# Patient Record
Sex: FEMALE | Race: WHITE | NOT HISPANIC OR LATINO | Employment: OTHER | URBAN - METROPOLITAN AREA
[De-identification: names, ages, dates, MRNs, and addresses within clinical notes are randomized per-mention and may not be internally consistent; named-entity substitution may affect disease eponyms.]

---

## 2023-02-20 LAB — CHOLESTEROL IN LDL (MG/DL) IN SER/PLAS BY DIRECT ASSAY: 64 MG/DL (ref 0–129)

## 2023-03-03 PROBLEM — Z00.00 MEDICARE ANNUAL WELLNESS VISIT, SUBSEQUENT: Status: ACTIVE | Noted: 2023-03-03

## 2023-03-03 PROBLEM — M48.50XA VERTEBRAL COMPRESSION FRACTURE (MULTI): Status: ACTIVE | Noted: 2023-03-03

## 2023-03-03 PROBLEM — I10 ESSENTIAL HYPERTENSION: Status: ACTIVE | Noted: 2023-03-03

## 2023-03-03 PROBLEM — K80.20 GALL STONES: Status: ACTIVE | Noted: 2023-03-03

## 2023-03-03 PROBLEM — M19.042 PRIMARY OSTEOARTHRITIS OF BOTH HANDS: Status: ACTIVE | Noted: 2023-03-03

## 2023-03-03 PROBLEM — E55.9 VITAMIN D INSUFFICIENCY: Status: ACTIVE | Noted: 2023-03-03

## 2023-03-03 PROBLEM — N81.10 FEMALE BLADDER PROLAPSE: Status: ACTIVE | Noted: 2023-03-03

## 2023-03-03 PROBLEM — R93.1 AGATSTON CAC SCORE, <100: Status: ACTIVE | Noted: 2023-03-03

## 2023-03-03 PROBLEM — E78.2 HYPERLIPEMIA, MIXED: Status: ACTIVE | Noted: 2023-03-03

## 2023-03-03 PROBLEM — N21.0: Status: ACTIVE | Noted: 2023-03-03

## 2023-03-03 PROBLEM — M85.80 OSTEOPENIA: Status: ACTIVE | Noted: 2023-03-03

## 2023-03-03 PROBLEM — N81.6 PELVIC ORGAN PROLAPSE QUANTIFICATION STAGE 3 RECTOCELE: Status: ACTIVE | Noted: 2023-03-03

## 2023-03-03 PROBLEM — M19.041 PRIMARY OSTEOARTHRITIS OF BOTH HANDS: Status: ACTIVE | Noted: 2023-03-03

## 2023-03-03 PROBLEM — M15.9 GENERALIZED OSTEOARTHRITIS OF MULTIPLE SITES: Status: ACTIVE | Noted: 2023-03-03

## 2023-03-03 RX ORDER — OXYQUINOLINE SULFATE AND SODIUM LAURYL SULFATE .25; .1 MG/G; MG/G
JELLY VAGINAL
COMMUNITY
Start: 2020-05-15 | End: 2024-03-15 | Stop reason: WASHOUT

## 2023-03-03 RX ORDER — ACETAMINOPHEN 500 MG
1 TABLET ORAL DAILY
COMMUNITY
Start: 2019-11-14 | End: 2023-09-08 | Stop reason: SDUPTHER

## 2023-03-03 RX ORDER — BENAZEPRIL HYDROCHLORIDE 20 MG/1
1 TABLET ORAL DAILY
COMMUNITY
Start: 2019-11-14 | End: 2023-04-21 | Stop reason: SDUPTHER

## 2023-03-03 RX ORDER — PRAVASTATIN SODIUM 20 MG/1
1 TABLET ORAL NIGHTLY
COMMUNITY
Start: 2019-11-14 | End: 2023-11-28 | Stop reason: SDUPTHER

## 2023-03-03 RX ORDER — ESTRADIOL 0.1 MG/G
CREAM VAGINAL 2 TIMES WEEKLY
COMMUNITY
Start: 2022-02-15 | End: 2024-03-15 | Stop reason: WASHOUT

## 2023-03-03 RX ORDER — CALCIUM CARBONATE/VITAMIN D3 600MG-5MCG
1 TABLET ORAL 2 TIMES DAILY
COMMUNITY
Start: 2019-11-14

## 2023-03-03 RX ORDER — PSYLLIUM SEED (WITH DEXTROSE)
POWDER (GRAM) ORAL
COMMUNITY

## 2023-03-03 RX ORDER — AMLODIPINE BESYLATE 10 MG/1
1 TABLET ORAL DAILY
COMMUNITY
Start: 2022-02-25 | End: 2023-05-03 | Stop reason: SDUPTHER

## 2023-03-03 RX ORDER — UBIQUINOL 100 MG
1 CAPSULE ORAL DAILY
COMMUNITY
Start: 2019-11-14

## 2023-03-03 NOTE — ASSESSMENT & PLAN NOTE
Medicare Wellness exam done  Already got flus shot  COVID vaccination series complete  Boostrix recommended  Prevnar 13 11/16  Pneumovax 23 10/15  DEXA 3/22  Shingrix #1 1/23   Cologuard 11/60  Coronary calcium scoring 6/19 - 12  nonsmoker

## 2023-03-03 NOTE — ASSESSMENT & PLAN NOTE
Improved on recheck.  Continue current medication for hypertension. Continue to monitor blood pressure.  Call if blood pressure consistently >140/90.  Low salt diet recommended.  Increase physical activity as able.  Reevaluate in 6 months.

## 2023-03-03 NOTE — PROGRESS NOTES
Subjective :  Chief Complaint: Harper Ruiz is an 82 y.o. female here for an annual Medicare Wellness visit, yearly physical and follow-up labs and chronic conditions.    BP running 130s-140s/80s-100s at home.    Patient otherwise feels well and has no other complaints or concerns.      I have reviewed and reconciled the medication list with the patient today.    Current Outpatient Medications:     amLODIPine (Norvasc) 10 mg tablet, Take 1 tablet (10 mg) by mouth once daily., Disp: , Rfl:     benazepril (Lotensin) 20 mg tablet, Take 1 tablet (20 mg) by mouth once daily., Disp: , Rfl:     calcium carbonate-vitamin D3 600 mg-5 mcg (200 unit) tablet, Take 1 tablet by mouth in the morning and 1 tablet before bedtime., Disp: , Rfl:     cholecalciferol (Vitamin D-3) 50 mcg (2,000 unit) capsule, Take 1 capsule (50 mcg) by mouth once daily., Disp: , Rfl:     coQ10, ubiquinol, 100 mg capsule, Take 1 capsule by mouth in the morning and 1 capsule in the evening and 1 capsule before bedtime., Disp: , Rfl:     estradiol (Estrace) 0.01 % (0.1 mg/gram) vaginal cream, Insert into the vagina 2 times a week. Insert pea-sized amount into the vagina twice weekly, Disp: , Rfl:     pravastatin (Pravachol) 20 mg tablet, Take 1 tablet (20 mg) by mouth once daily at bedtime., Disp: , Rfl:     psyllium husk, with sugar, (Metamucil, with sugar,) 3.4 gram/12 gram powder, Take by mouth., Disp: , Rfl:     vit A/vit C/vit E/zinc/copper (PRESERVISION AREDS ORAL), Take 2 capsules by mouth once daily., Disp: , Rfl:     L. acidophilus/Bifid. animalis 32 billion cell capsule, Take by mouth., Disp: , Rfl:     oxyquinoline-sod.lauryl sulfat (Trimo-Gr Jelly) 0.025-0.01 % gel, Insert into the vagina., Disp: , Rfl:     The patient's relevant past medical, surgical, family and social history was reviewed in [facility].  All pertinent lab work and results for this visit were reviewed with patient.    Review of Systems   A complete review of systems was performed  "and all systems were normal except what is noted in the HPI.      List of current healthcare providers:  Patient Care Team:  Kady Shane MD as PCP - General  Kady Shane MD as PCP - [employer] PCP        Over the past 2 weeks, how often have you been bothered by any of the following problems?  Little interest or pleasure in doing things: Not at all  Feeling down, depressed, or hopeless: Not at all  Patient Health Questionnaire-2 Score: 0             Objective :  /87   Pulse 86   Temp 36.1 °C (97 °F)   Ht 1.549 m (5' 1\")   Wt 66.4 kg (146 lb 6.4 oz)   SpO2 96%   BMI 27.66 kg/m²    No results found.  Physical Exam  Constitutional:       Appearance: Normal appearance.   HENT:      Head: Normocephalic and atraumatic.   Neck:      Vascular: No carotid bruit.   Cardiovascular:      Rate and Rhythm: Normal rate and regular rhythm.      Heart sounds: Normal heart sounds.   Pulmonary:      Effort: Pulmonary effort is normal.      Breath sounds: Normal breath sounds. No wheezing, rhonchi or rales.   Abdominal:      General: Abdomen is flat. Bowel sounds are normal.      Palpations: Abdomen is soft.      Tenderness: There is no abdominal tenderness. There is no guarding.   Musculoskeletal:         General: Normal range of motion.      Right lower leg: No edema.      Left lower leg: No edema.   Skin:     General: Skin is dry.   Neurological:      General: No focal deficit present.      Mental Status: She is alert and oriented to person, place, and time.   Psychiatric:         Mood and Affect: Mood normal.         Behavior: Behavior normal.         Thought Content: Thought content normal.         Assessment/Plan :  Problem List Items Addressed This Visit       Essential hypertension     Improved on recheck.  Continue current medication for hypertension. Continue to monitor blood pressure.  Call if blood pressure consistently >140/90.  Low salt diet recommended.  Increase physical " activity as able.  Reevaluate in 6 months.           Relevant Orders    Comprehensive Metabolic Panel    Hyperlipemia, mixed     Well controlled continue current medication. Reevaluate in 6 months.            Relevant Orders    Cholesterol, LDL Direct    Lipid Panel    Comprehensive Metabolic Panel    Vertebral compression fracture (CMS/HCC)     Asymptomatic  Reevaluate in 6 months.         Relevant Orders    XR DEXA bone density    Vitamin D insufficiency     Well controlled continue current medication. Reevaluate in 6 months.            Medicare annual wellness visit, subsequent - Primary     Medicare Wellness exam done  Already got flus shot  COVID vaccination series complete  Boostrix recommended  Prevnar 13 11/16  Pneumovax 23 10/15  DEXA 3/22  Shingrix #1 1/23   Cologuard 11/60  Coronary calcium scoring 6/19 - 12  nonsmoker         Relevant Orders    XR DEXA bone density     Other Visit Diagnoses       Annual physical exam        yearly physical done    Relevant Orders    XR DEXA bone density    Asymptomatic menopause        Relevant Orders    XR DEXA bone density               The following health maintenance schedule was reviewed with the patient and provided in printed form in the after visit summary:  Health Maintenance   Topic Date Due    Annual Wellness Visit (AWV)  Never done    DTaP/Tdap/Td Vaccines (1 - Tdap) Never done    Diabetes Screening  Never done    Bone Density Scan  03/15/2023    Zoster Vaccines (2 of 2) 03/24/2023    Lipid Panel  02/20/2028    Influenza Vaccine  Completed    Pneumococcal Vaccine: 65+ Years  Completed    COVID-19 Vaccine  Completed    HIB Vaccines  Aged Out    Hepatitis B Vaccines  Aged Out    IPV Vaccines  Aged Out    Hepatitis A Vaccines  Aged Out    Meningococcal Vaccine  Aged Out    Rotavirus Vaccines  Aged Out    HPV Vaccines  Aged Out             Patient understands and agrees with treatment plan.          Kady Shane MD

## 2023-03-06 ENCOUNTER — OFFICE VISIT (OUTPATIENT)
Dept: PRIMARY CARE | Facility: CLINIC | Age: 83
End: 2023-03-06
Payer: MEDICARE

## 2023-03-06 VITALS
SYSTOLIC BLOOD PRESSURE: 138 MMHG | HEIGHT: 61 IN | TEMPERATURE: 97 F | OXYGEN SATURATION: 96 % | DIASTOLIC BLOOD PRESSURE: 88 MMHG | BODY MASS INDEX: 27.64 KG/M2 | HEART RATE: 86 BPM | WEIGHT: 146.4 LBS

## 2023-03-06 DIAGNOSIS — S32.000S COMPRESSION FRACTURE OF LUMBAR VERTEBRA, UNSPECIFIED LUMBAR VERTEBRAL LEVEL, SEQUELA: ICD-10-CM

## 2023-03-06 DIAGNOSIS — Z78.0 ASYMPTOMATIC MENOPAUSE: ICD-10-CM

## 2023-03-06 DIAGNOSIS — Z00.00 MEDICARE ANNUAL WELLNESS VISIT, SUBSEQUENT: Primary | ICD-10-CM

## 2023-03-06 DIAGNOSIS — E78.2 HYPERLIPEMIA, MIXED: ICD-10-CM

## 2023-03-06 DIAGNOSIS — Z00.00 ANNUAL PHYSICAL EXAM: ICD-10-CM

## 2023-03-06 DIAGNOSIS — I10 ESSENTIAL HYPERTENSION: ICD-10-CM

## 2023-03-06 DIAGNOSIS — E55.9 VITAMIN D INSUFFICIENCY: ICD-10-CM

## 2023-03-06 PROCEDURE — G0439 PPPS, SUBSEQ VISIT: HCPCS | Performed by: FAMILY MEDICINE

## 2023-03-06 PROCEDURE — 99397 PER PM REEVAL EST PAT 65+ YR: CPT | Performed by: FAMILY MEDICINE

## 2023-03-06 PROCEDURE — 1157F ADVNC CARE PLAN IN RCRD: CPT | Performed by: FAMILY MEDICINE

## 2023-03-06 PROCEDURE — 1159F MED LIST DOCD IN RCRD: CPT | Performed by: FAMILY MEDICINE

## 2023-03-06 PROCEDURE — 1036F TOBACCO NON-USER: CPT | Performed by: FAMILY MEDICINE

## 2023-03-06 PROCEDURE — 1160F RVW MEDS BY RX/DR IN RCRD: CPT | Performed by: FAMILY MEDICINE

## 2023-03-06 PROCEDURE — 99214 OFFICE O/P EST MOD 30 MIN: CPT | Performed by: FAMILY MEDICINE

## 2023-03-06 PROCEDURE — 3075F SYST BP GE 130 - 139MM HG: CPT | Performed by: FAMILY MEDICINE

## 2023-03-06 PROCEDURE — 3079F DIAST BP 80-89 MM HG: CPT | Performed by: FAMILY MEDICINE

## 2023-03-06 PROCEDURE — 1170F FXNL STATUS ASSESSED: CPT | Performed by: FAMILY MEDICINE

## 2023-03-06 ASSESSMENT — ACTIVITIES OF DAILY LIVING (ADL)
GROCERY_SHOPPING: INDEPENDENT
BATHING: INDEPENDENT
DOING_HOUSEWORK: INDEPENDENT
MANAGING_FINANCES: INDEPENDENT
TAKING_MEDICATION: INDEPENDENT
DRESSING: INDEPENDENT

## 2023-03-06 ASSESSMENT — PATIENT HEALTH QUESTIONNAIRE - PHQ9
1. LITTLE INTEREST OR PLEASURE IN DOING THINGS: NOT AT ALL
SUM OF ALL RESPONSES TO PHQ9 QUESTIONS 1 AND 2: 0
2. FEELING DOWN, DEPRESSED OR HOPELESS: NOT AT ALL

## 2023-03-06 ASSESSMENT — ENCOUNTER SYMPTOMS
OCCASIONAL FEELINGS OF UNSTEADINESS: 1
DEPRESSION: 0
LOSS OF SENSATION IN FEET: 0

## 2023-03-08 ENCOUNTER — TELEPHONE (OUTPATIENT)
Dept: PRIMARY CARE | Facility: CLINIC | Age: 83
End: 2023-03-08
Payer: MEDICARE

## 2023-03-29 ASSESSMENT — ACTIVITIES OF DAILY LIVING (ADL)
DRESSING: INDEPENDENT
TAKING_MEDICATION: INDEPENDENT
GROCERY_SHOPPING: INDEPENDENT
BATHING: INDEPENDENT
MANAGING_FINANCES: INDEPENDENT
DOING_HOUSEWORK: INDEPENDENT

## 2023-04-21 ENCOUNTER — TELEPHONE (OUTPATIENT)
Dept: PRIMARY CARE | Facility: CLINIC | Age: 83
End: 2023-04-21
Payer: MEDICARE

## 2023-04-21 DIAGNOSIS — I10 HYPERTENSION, ESSENTIAL: ICD-10-CM

## 2023-04-21 RX ORDER — BENAZEPRIL HYDROCHLORIDE 20 MG/1
20 TABLET ORAL DAILY
Qty: 90 TABLET | Refills: 3 | Status: SHIPPED | OUTPATIENT
Start: 2023-04-21 | End: 2024-03-15 | Stop reason: SDUPTHER

## 2023-04-21 NOTE — TELEPHONE ENCOUNTER
Rx Refill Request Telephone Encounter    Name:  Harper Ruzi  :  [id number]  Medication Name:  Benazapril  20mg  oral  daily  90    Specific Pharmacy location:  [facility]  Date of last appointment:    Date of next appointment:    Best number to reach patient:  [phone]

## 2023-05-03 ENCOUNTER — TELEPHONE (OUTPATIENT)
Dept: PRIMARY CARE | Facility: CLINIC | Age: 83
End: 2023-05-03
Payer: MEDICARE

## 2023-05-03 DIAGNOSIS — I10 ESSENTIAL HYPERTENSION: Primary | ICD-10-CM

## 2023-05-03 RX ORDER — AMLODIPINE BESYLATE 10 MG/1
10 TABLET ORAL DAILY
Qty: 90 TABLET | Refills: 0 | Status: SHIPPED | OUTPATIENT
Start: 2023-05-03 | End: 2023-08-28 | Stop reason: SDUPTHER

## 2023-05-03 NOTE — TELEPHONE ENCOUNTER
Rx Refill Request Telephone Encounter    Name:  Harper Ruiz  :  [id number]  Medication Name:      Amlodipine Besylate 10 MG Oral Tablet 1 tab taken daily as directed                    Specific Pharmacy location:  [facility]    Date of last appointment:  2023  Date of next appointment:  2023  Best number to reach patient:  [phone]

## 2023-07-10 DIAGNOSIS — I10 ESSENTIAL HYPERTENSION: ICD-10-CM

## 2023-08-28 ENCOUNTER — TELEPHONE (OUTPATIENT)
Dept: PRIMARY CARE | Facility: CLINIC | Age: 83
End: 2023-08-28
Payer: MEDICARE

## 2023-08-28 DIAGNOSIS — I10 ESSENTIAL HYPERTENSION: ICD-10-CM

## 2023-08-28 RX ORDER — AMLODIPINE BESYLATE 10 MG/1
TABLET ORAL
Qty: 90 TABLET | Refills: 0 | OUTPATIENT
Start: 2023-08-28

## 2023-08-28 NOTE — TELEPHONE ENCOUNTER
Rx Refill Request Telephone Encounter    Name:  Harper Ruiz  :  [id number]  Medication Name:      amLODIPine (Norvasc) 10 mg tablet             Specific Pharmacy location:    [facility] - [address]        Date of last appointment:  3/6/23  Date of next appointment: 23   Best number to reach patient: [phone]

## 2023-08-29 RX ORDER — AMLODIPINE BESYLATE 10 MG/1
10 TABLET ORAL DAILY
Qty: 90 TABLET | Refills: 3 | Status: SHIPPED | OUTPATIENT
Start: 2023-08-29 | End: 2024-08-28

## 2023-08-31 ENCOUNTER — LAB (OUTPATIENT)
Dept: LAB | Facility: LAB | Age: 83
End: 2023-08-31
Payer: MEDICARE

## 2023-08-31 DIAGNOSIS — E78.2 HYPERLIPEMIA, MIXED: ICD-10-CM

## 2023-08-31 DIAGNOSIS — I10 ESSENTIAL HYPERTENSION: ICD-10-CM

## 2023-08-31 LAB
ALANINE AMINOTRANSFERASE (SGPT) (U/L) IN SER/PLAS: 29 U/L (ref 7–45)
ALBUMIN (G/DL) IN SER/PLAS: 4.3 G/DL (ref 3.4–5)
ALKALINE PHOSPHATASE (U/L) IN SER/PLAS: 71 U/L (ref 33–136)
ANION GAP IN SER/PLAS: 10 MMOL/L (ref 10–20)
ASPARTATE AMINOTRANSFERASE (SGOT) (U/L) IN SER/PLAS: 34 U/L (ref 9–39)
BILIRUBIN TOTAL (MG/DL) IN SER/PLAS: 0.7 MG/DL (ref 0–1.2)
CALCIUM (MG/DL) IN SER/PLAS: 9.2 MG/DL (ref 8.6–10.3)
CARBON DIOXIDE, TOTAL (MMOL/L) IN SER/PLAS: 31 MMOL/L (ref 21–32)
CHLORIDE (MMOL/L) IN SER/PLAS: 105 MMOL/L (ref 98–107)
CHOLESTEROL (MG/DL) IN SER/PLAS: 141 MG/DL (ref 0–199)
CHOLESTEROL IN HDL (MG/DL) IN SER/PLAS: 63.2 MG/DL
CHOLESTEROL/HDL RATIO: 2.2
CREATININE (MG/DL) IN SER/PLAS: 0.65 MG/DL (ref 0.5–1.05)
GFR FEMALE: 87 ML/MIN/1.73M2
GLUCOSE (MG/DL) IN SER/PLAS: 88 MG/DL (ref 74–99)
LDL: 56 MG/DL (ref 0–99)
POTASSIUM (MMOL/L) IN SER/PLAS: 3.5 MMOL/L (ref 3.5–5.3)
PROTEIN TOTAL: 7.2 G/DL (ref 6.4–8.2)
SODIUM (MMOL/L) IN SER/PLAS: 142 MMOL/L (ref 136–145)
TRIGLYCERIDE (MG/DL) IN SER/PLAS: 109 MG/DL (ref 0–149)
UREA NITROGEN (MG/DL) IN SER/PLAS: 10 MG/DL (ref 6–23)
VLDL: 22 MG/DL (ref 0–40)

## 2023-08-31 PROCEDURE — 80061 LIPID PANEL: CPT

## 2023-08-31 PROCEDURE — 83721 ASSAY OF BLOOD LIPOPROTEIN: CPT

## 2023-08-31 PROCEDURE — 36415 COLL VENOUS BLD VENIPUNCTURE: CPT

## 2023-08-31 PROCEDURE — 80053 COMPREHEN METABOLIC PANEL: CPT

## 2023-09-01 LAB — CHOLESTEROL IN LDL (MG/DL) IN SER/PLAS BY DIRECT ASSAY: 64 MG/DL (ref 0–129)

## 2023-09-01 NOTE — PROGRESS NOTES
"Subjective   Patient ID: Harper Ruiz is a 83 y.o. female who presents for Follow-up.    HPI  Patient presents today for follow up labs and chronic conditions.  Patient feels well. No other complaints or concerns.    The patient's relevant past medical, surgical, family, and social history was reviewed in [facility].  All pertinent lab work and results for this visit were reviewed with patient.    No results found for this or any previous visit (from the past 168 hour(s)).          Review of Systems   A complete review of systems was performed and all systems were normal except what is noted in the HPI.        Objective   /77   Pulse 76   Temp 36.3 °C (97.4 °F)   Ht 1.549 m (5' 1\")   Wt 65.5 kg (144 lb 6.4 oz)   SpO2 96%   BMI 27.28 kg/m²    Physical Exam  Constitutional:       General: She is not in acute distress.     Appearance: Normal appearance.   HENT:      Head: Normocephalic and atraumatic.   Cardiovascular:      Rate and Rhythm: Normal rate and regular rhythm.      Heart sounds: Normal heart sounds.   Pulmonary:      Effort: Pulmonary effort is normal.      Breath sounds: Normal breath sounds.   Abdominal:      General: Abdomen is flat. Bowel sounds are normal.      Palpations: Abdomen is soft.      Tenderness: There is no abdominal tenderness.   Musculoskeletal:      Right lower leg: Edema (trace) present.      Left lower leg: Edema (trace) present.   Neurological:      Mental Status: She is alert.   Psychiatric:         Mood and Affect: Mood normal.         Behavior: Behavior normal.         Thought Content: Thought content normal.         Health Maintenance Due   Topic Date Due    DTaP/Tdap/Td Vaccines (1 - Tdap) Never done    COVID-19 Vaccine (4 - Moderna risk series) 12/17/2022    Influenza Vaccine (1) 09/01/2023        Assessment/Plan   Problem List Items Addressed This Visit       Essential hypertension - Primary     Well controlled continue current medication. Reevaluate in 6 months.         "    Relevant Orders    TSH with reflex to Free T4 if abnormal    CBC    Comprehensive Metabolic Panel    Hyperlipemia, mixed     Well controlled continue current medication. Reevaluate in 6 months.            Relevant Orders    TSH with reflex to Free T4 if abnormal    Lipid Panel    Cholesterol, LDL Direct    Vitamin D insufficiency    Relevant Orders    Vitamin D 1,25 Dihydroxy (for eval of hypercalcemia)         Patient and family understand and agree with care plan.           Kady Shane MD

## 2023-09-01 NOTE — PATIENT INSTRUCTIONS
I would like you to follow up in 6 months  Please have all labs that were ordered done at least 1 week prior to your visit.    I would recommend the new COVID booster which should be out later this month and the RSV vaccine (Arexvy) - you can get this at the pharmacy.

## 2023-09-08 ENCOUNTER — OFFICE VISIT (OUTPATIENT)
Dept: PRIMARY CARE | Facility: CLINIC | Age: 83
End: 2023-09-08
Payer: MEDICARE

## 2023-09-08 VITALS
TEMPERATURE: 97.4 F | HEART RATE: 76 BPM | WEIGHT: 144.4 LBS | BODY MASS INDEX: 27.26 KG/M2 | HEIGHT: 61 IN | OXYGEN SATURATION: 96 % | DIASTOLIC BLOOD PRESSURE: 77 MMHG | SYSTOLIC BLOOD PRESSURE: 127 MMHG

## 2023-09-08 DIAGNOSIS — E78.2 HYPERLIPEMIA, MIXED: ICD-10-CM

## 2023-09-08 DIAGNOSIS — I10 ESSENTIAL HYPERTENSION: Primary | ICD-10-CM

## 2023-09-08 DIAGNOSIS — E55.9 VITAMIN D INSUFFICIENCY: ICD-10-CM

## 2023-09-08 PROCEDURE — 90662 IIV NO PRSV INCREASED AG IM: CPT | Performed by: FAMILY MEDICINE

## 2023-09-08 PROCEDURE — 1159F MED LIST DOCD IN RCRD: CPT | Performed by: FAMILY MEDICINE

## 2023-09-08 PROCEDURE — 3078F DIAST BP <80 MM HG: CPT | Performed by: FAMILY MEDICINE

## 2023-09-08 PROCEDURE — 1036F TOBACCO NON-USER: CPT | Performed by: FAMILY MEDICINE

## 2023-09-08 PROCEDURE — G0008 ADMIN INFLUENZA VIRUS VAC: HCPCS | Performed by: FAMILY MEDICINE

## 2023-09-08 PROCEDURE — 3074F SYST BP LT 130 MM HG: CPT | Performed by: FAMILY MEDICINE

## 2023-09-08 PROCEDURE — 1157F ADVNC CARE PLAN IN RCRD: CPT | Performed by: FAMILY MEDICINE

## 2023-09-08 PROCEDURE — 1160F RVW MEDS BY RX/DR IN RCRD: CPT | Performed by: FAMILY MEDICINE

## 2023-09-08 PROCEDURE — 99213 OFFICE O/P EST LOW 20 MIN: CPT | Performed by: FAMILY MEDICINE

## 2023-11-28 ENCOUNTER — TELEPHONE (OUTPATIENT)
Dept: PRIMARY CARE | Facility: CLINIC | Age: 83
End: 2023-11-28
Payer: MEDICARE

## 2023-11-28 DIAGNOSIS — E78.2 HYPERLIPEMIA, MIXED: ICD-10-CM

## 2023-11-28 RX ORDER — PRAVASTATIN SODIUM 20 MG/1
20 TABLET ORAL NIGHTLY
Qty: 90 TABLET | Refills: 3 | Status: SHIPPED | OUTPATIENT
Start: 2023-11-28 | End: 2024-11-27

## 2023-11-28 NOTE — TELEPHONE ENCOUNTER
Rx Refill Request Telephone Encounter    Name:  Harper Ruiz  :  [id number]  Medication Name:  Pravastatin  20 mg  Route : oral  Frequency : dialy  Quantity : 90    Specific Pharmacy location:  [facility]  Date of last appointment:    Date of next appointment:  March 15  Best number to reach patient:  [phone]

## 2024-03-07 ENCOUNTER — LAB (OUTPATIENT)
Dept: LAB | Facility: LAB | Age: 84
End: 2024-03-07
Payer: MEDICARE

## 2024-03-07 DIAGNOSIS — E78.2 HYPERLIPEMIA, MIXED: ICD-10-CM

## 2024-03-07 DIAGNOSIS — E55.9 VITAMIN D INSUFFICIENCY: ICD-10-CM

## 2024-03-07 DIAGNOSIS — I10 ESSENTIAL HYPERTENSION: ICD-10-CM

## 2024-03-07 LAB
ALBUMIN SERPL BCP-MCNC: 4.2 G/DL (ref 3.4–5)
ALP SERPL-CCNC: 88 U/L (ref 33–136)
ALT SERPL W P-5'-P-CCNC: 26 U/L (ref 7–45)
ANION GAP SERPL CALC-SCNC: 9 MMOL/L (ref 10–20)
AST SERPL W P-5'-P-CCNC: 33 U/L (ref 9–39)
BILIRUB SERPL-MCNC: 0.6 MG/DL (ref 0–1.2)
BUN SERPL-MCNC: 11 MG/DL (ref 6–23)
CALCIUM SERPL-MCNC: 9.2 MG/DL (ref 8.6–10.3)
CHLORIDE SERPL-SCNC: 104 MMOL/L (ref 98–107)
CHOLEST SERPL-MCNC: 171 MG/DL (ref 0–199)
CHOLESTEROL/HDL RATIO: 2.4
CO2 SERPL-SCNC: 31 MMOL/L (ref 21–32)
CREAT SERPL-MCNC: 0.59 MG/DL (ref 0.5–1.05)
EGFRCR SERPLBLD CKD-EPI 2021: 89 ML/MIN/1.73M*2
ERYTHROCYTE [DISTWIDTH] IN BLOOD BY AUTOMATED COUNT: 13.9 % (ref 11.5–14.5)
GLUCOSE SERPL-MCNC: 93 MG/DL (ref 74–99)
HCT VFR BLD AUTO: 43.8 % (ref 36–46)
HDLC SERPL-MCNC: 71.6 MG/DL
HGB BLD-MCNC: 14.4 G/DL (ref 12–16)
LDLC SERPL CALC-MCNC: 75 MG/DL
LDLC SERPL DIRECT ASSAY-MCNC: 82 MG/DL (ref 0–129)
MCH RBC QN AUTO: 30.5 PG (ref 26–34)
MCHC RBC AUTO-ENTMCNC: 32.9 G/DL (ref 32–36)
MCV RBC AUTO: 93 FL (ref 80–100)
NON HDL CHOLESTEROL: 99 MG/DL (ref 0–149)
NRBC BLD-RTO: 0 /100 WBCS (ref 0–0)
PLATELET # BLD AUTO: 193 X10*3/UL (ref 150–450)
POTASSIUM SERPL-SCNC: 3.6 MMOL/L (ref 3.5–5.3)
PROT SERPL-MCNC: 7.3 G/DL (ref 6.4–8.2)
RBC # BLD AUTO: 4.72 X10*6/UL (ref 4–5.2)
SODIUM SERPL-SCNC: 140 MMOL/L (ref 136–145)
TRIGL SERPL-MCNC: 122 MG/DL (ref 0–149)
TSH SERPL-ACNC: 3.18 MIU/L (ref 0.44–3.98)
VLDL: 24 MG/DL (ref 0–40)
WBC # BLD AUTO: 6.1 X10*3/UL (ref 4.4–11.3)

## 2024-03-07 PROCEDURE — 80061 LIPID PANEL: CPT

## 2024-03-07 PROCEDURE — 84443 ASSAY THYROID STIM HORMONE: CPT

## 2024-03-07 PROCEDURE — 82652 VIT D 1 25-DIHYDROXY: CPT

## 2024-03-07 PROCEDURE — 80053 COMPREHEN METABOLIC PANEL: CPT

## 2024-03-07 PROCEDURE — 83721 ASSAY OF BLOOD LIPOPROTEIN: CPT

## 2024-03-07 PROCEDURE — 85027 COMPLETE CBC AUTOMATED: CPT

## 2024-03-07 PROCEDURE — 36415 COLL VENOUS BLD VENIPUNCTURE: CPT

## 2024-03-10 LAB — 1,25(OH)2D3 SERPL-MCNC: 130 PG/ML (ref 19.9–79.3)

## 2024-03-11 ENCOUNTER — TELEPHONE (OUTPATIENT)
Dept: PRIMARY CARE | Facility: CLINIC | Age: 84
End: 2024-03-11
Payer: MEDICARE

## 2024-03-11 NOTE — TELEPHONE ENCOUNTER
----- Message from Kady Shane MD sent at 3/10/2024  9:11 PM EDT -----  Please notify patient vitamin D level too high needs to stop vitamin D please take off med list

## 2024-03-11 NOTE — TELEPHONE ENCOUNTER
Called and notified patient her Vitamin D level was elevated and to stop her Vitamin D supplement. Patient understood and agreed. The patient stated she is taking both calcium carbonate-vitamin D3 and a separate vitamin D3 supplement. I do not see the separate vitamin D3 on her medication list.

## 2024-03-12 NOTE — ASSESSMENT & PLAN NOTE
Hold vitamin d supplement. She is taking ca+vit d3 with addition vitamin d 2,000IU daily   Instructed to hold single vitamin d  Check in 6 months

## 2024-03-12 NOTE — PATIENT INSTRUCTIONS
I recommend Shingrix at the pharmacy.    I would like you to follow up in 6 months  Please have all labs that were ordered done at least 1 week prior to your visit.

## 2024-03-12 NOTE — ASSESSMENT & PLAN NOTE
Elevated, likely related to anxiety   Patient is anxious about a loved one in LTC facility]  Take amlodipine 10mg when home. Takes typically in the evening   Call for consistently elevated bp >140/90  JOSE F

## 2024-03-12 NOTE — PROGRESS NOTES
Subjective :  Chief Complaint: Harper Ruiz is an 83 y.o. female here for an annual Medicare Wellness visit, annual physical, and follow up labs and chronic conditions.      Patient otherwise feels well. No other complaints or concerns.    I have reviewed and reconciled the medication list with the patient today.    Current Outpatient Medications:     amLODIPine (Norvasc) 10 mg tablet, Take 1 tablet (10 mg) by mouth once daily., Disp: 90 tablet, Rfl: 3    benazepril (Lotensin) 20 mg tablet, Take 1 tablet (20 mg) by mouth once daily., Disp: 90 tablet, Rfl: 3    calcium carbonate-vitamin D3 600 mg-5 mcg (200 unit) tablet, Take 1 tablet by mouth 2 times a day., Disp: , Rfl:     coQ10, ubiquinol, 100 mg capsule, Take 1 capsule by mouth once daily., Disp: , Rfl:     estradiol (Estrace) 0.01 % (0.1 mg/gram) vaginal cream, Insert into the vagina 2 times a week. Insert pea-sized amount into the vagina twice weekly, Disp: , Rfl:     L. acidophilus/Bifid. animalis 32 billion cell capsule, Take by mouth., Disp: , Rfl:     oxyquinoline-sod.lauryl sulfat (Trimo-Gr Jelly) 0.025-0.01 % gel, Insert into the vagina., Disp: , Rfl:     pravastatin (Pravachol) 20 mg tablet, Take 1 tablet (20 mg) by mouth once daily at bedtime., Disp: 90 tablet, Rfl: 3    psyllium husk, with sugar, (Metamucil, with sugar,) 3.4 gram/12 gram powder, Take by mouth., Disp: , Rfl:     vit A/vit C/vit E/zinc/copper (PRESERVISION AREDS ORAL), Take 2 capsules by mouth once daily., Disp: , Rfl:     The patient's relevant past medical, surgical, family and social history was reviewed in [facility].  All pertinent lab work and results for this visit were reviewed with patient.    Lab on 03/07/2024   Component Date Value Ref Range Status    Thyroid Stimulating Hormone 03/07/2024 3.18  0.44 - 3.98 mIU/L Final    WBC 03/07/2024 6.1  4.4 - 11.3 x10*3/uL Final    nRBC 03/07/2024 0.0  0.0 - 0.0 /100 WBCs Final    RBC 03/07/2024 4.72  4.00 - 5.20 x10*6/uL Final    Hemoglobin  03/07/2024 14.4  12.0 - 16.0 g/dL Final    Hematocrit 03/07/2024 43.8  36.0 - 46.0 % Final    MCV 03/07/2024 93  80 - 100 fL Final    MCH 03/07/2024 30.5  26.0 - 34.0 pg Final    MCHC 03/07/2024 32.9  32.0 - 36.0 g/dL Final    RDW 03/07/2024 13.9  11.5 - 14.5 % Final    Platelets 03/07/2024 193  150 - 450 x10*3/uL Final    Cholesterol 03/07/2024 171  0 - 199 mg/dL Final          Age      Desirable   Borderline High   High     0-19 Y     0 - 169       170 - 199     >/= 200    20-24 Y     0 - 189       190 - 224     >/= 225         >24 Y     0 - 199       200 - 239     >/= 240   **All ranges are based on fasting samples. Specific   therapeutic targets will vary based on patient-specific   cardiac risk.    Pediatric guidelines reference:Pediatrics 2011, 128(S5).Adult guidelines reference: NCEP ATPIII Guidelines,SHAHID 2001, 258:2486-97    Venipuncture immediately after or during the administration of Metamizole may lead to falsely low results. Testing should be performed immediately prior to Metamizole dosing.    HDL-Cholesterol 03/07/2024 71.6  mg/dL Final      Age       Very Low   Low     Normal    High    0-19 Y    < 35      < 40     40-45     ----  20-24 Y    ----     < 40      >45      ----        >24 Y      ----     < 40     40-60      >60      Cholesterol/HDL Ratio 03/07/2024 2.4   Final      Ref Values  Desirable  < 3.4  High Risk  > 5.0    LDL Calculated 03/07/2024 75  <=99 mg/dL Final                                Near   Borderline      AGE      Desirable  Optimal    High     High     Very High     0-19 Y     0 - 109     ---    110-129   >/= 130     ----    20-24 Y     0 - 119     ---    120-159   >/= 160     ----      >24 Y     0 -  99   100-129  130-159   160-189     >/=190      VLDL 03/07/2024 24  0 - 40 mg/dL Final    Triglycerides 03/07/2024 122  0 - 149 mg/dL Final       Age         Desirable   Borderline High   High     Very High   0 D-90 D    19 - 174         ----         ----        ----  91 D- 9 Y      0 -  74        75 -  99     >/= 100      ----    10-19 Y     0 -  89        90 - 129     >/= 130      ----    20-24 Y     0 - 114       115 - 149     >/= 150      ----         >24 Y     0 - 149       150 - 199    200- 499    >/= 500    Venipuncture immediately after or during the administration of Metamizole may lead to falsely low results. Testing should be performed immediately prior to Metamizole dosing.    Non HDL Cholesterol 03/07/2024 99  0 - 149 mg/dL Final          Age       Desirable   Borderline High   High     Very High     0-19 Y     0 - 119       120 - 144     >/= 145    >/= 160    20-24 Y     0 - 149       150 - 189     >/= 190      ----         >24 Y    30 mg/dL above LDL Cholesterol goal      LDL, Direct 03/07/2024 82  0 - 129 mg/dL Final    Glucose 03/07/2024 93  74 - 99 mg/dL Final    Sodium 03/07/2024 140  136 - 145 mmol/L Final    Potassium 03/07/2024 3.6  3.5 - 5.3 mmol/L Final    Chloride 03/07/2024 104  98 - 107 mmol/L Final    Bicarbonate 03/07/2024 31  21 - 32 mmol/L Final    Anion Gap 03/07/2024 9 (L)  10 - 20 mmol/L Final    Urea Nitrogen 03/07/2024 11  6 - 23 mg/dL Final    Creatinine 03/07/2024 0.59  0.50 - 1.05 mg/dL Final    eGFR 03/07/2024 89  >60 mL/min/1.73m*2 Final    Calculations of estimated GFR are performed using the 2021 CKD-EPI Study Refit equation without the race variable for the IDMS-Traceable creatinine methods.  https://jasn.asnjournals.org/content/early/2021/09/22/ASN.5134291099    Calcium 03/07/2024 9.2  8.6 - 10.3 mg/dL Final    Albumin 03/07/2024 4.2  3.4 - 5.0 g/dL Final    Alkaline Phosphatase 03/07/2024 88  33 - 136 U/L Final    Total Protein 03/07/2024 7.3  6.4 - 8.2 g/dL Final    AST 03/07/2024 33  9 - 39 U/L Final    Bilirubin, Total 03/07/2024 0.6  0.0 - 1.2 mg/dL Final    ALT 03/07/2024 26  7 - 45 U/L Final    Patients treated with Sulfasalazine may generate falsely decreased results for ALT.    Vit D, 1,25-Dihydroxy 03/07/2024 130.0 (H)  19.9 - 79.3 pg/mL  Final    INTERPRETIVE INFORMATION: Vitamin D, 1,25-Dihydroxy    This test is primarily indicated during patient evaluation for   hypercalcemia and renal failure. A normal result does not rule out   Vitamin D deficiency. The recommended test for diagnosing Vitamin   D deficiency is Vitamin D 25-hydroxy.  Performed By: [facility]  [address] [place] [id number]  : George Tate MD, PhD  CLIA Number: [id number]         Review of Systems   A complete review of systems was performed and all systems were normal except what is noted in the HPI.      List of current healthcare providers:  Patient Care Team:  Kady Shane MD as PCP - General  Kady Shane MD as PCP - [employer] PCP                      Advance Care Planning:    Living Will: {YES (DEF)/NO:73010}  POA: {YES (DEF)/NO:48269}    Objective :  There were no vitals taken for this visit.   No results found.  Physical Exam    Assessment/Plan :  Problem List Items Addressed This Visit       Essential hypertension     Well controlled continue current medication. Reevaluate in 6 months.            Relevant Orders    Comprehensive Metabolic Panel    Hyperlipemia, mixed     Well controlled continue current medication. Reevaluate in 6 months.            Relevant Orders    Cholesterol, LDL Direct    Lipid Panel    Vertebral compression fracture (CMS/HCC)     Asymptomatic  Reevaluate in 6 months.  DEXA ordered         Relevant Orders    XR DEXA bone density    Vitamin D insufficiency     Vitamin D held - level high  Check in 6 months         Relevant Orders    Vitamin D 25-Hydroxy,Total (for eval of Vitamin D levels)    Medicare annual wellness visit, subsequent - Primary     Medical Wellness exam done.   Flu shot 9/23  RSV, COVID booster 10/23  Prevnar 13 11/12  Pneumovax 23 11/16  Shingrix series complete.  DEXA ordered  Nonsmoker          Other Visit Diagnoses       Annual physical exam        Yearly  physical done    Asymptomatic menopause        Relevant Orders    XR DEXA bone density               The following health maintenance schedule was reviewed with the patient and provided in printed form in the after visit summary:  Health Maintenance   Topic Date Due    Medicare Annual Wellness Visit (AWV)  Never done    DTaP/Tdap/Td Vaccines (1 - Tdap) Never done    COVID-19 Vaccine (5 - 2023-24 season) 12/14/2023    Bone Density Scan  03/15/2024    Diabetes Screening  08/31/2024    Lipid Panel  03/07/2029    Influenza Vaccine  Completed    Pneumococcal Vaccine: 65+ Years  Completed    Zoster Vaccines  Completed    HIB Vaccines  Aged Out    Hepatitis B Vaccines  Aged Out    IPV Vaccines  Aged Out    Hepatitis A Vaccines  Aged Out    Meningococcal Vaccine  Aged Out    Rotavirus Vaccines  Aged Out    HPV Vaccines  Aged Out    Irritable Bowel Syndrome  Discontinued           Patient understands and agrees with treatment plan.          Kady Shane MD

## 2024-03-15 ENCOUNTER — OFFICE VISIT (OUTPATIENT)
Dept: PRIMARY CARE | Facility: CLINIC | Age: 84
End: 2024-03-15
Payer: MEDICARE

## 2024-03-15 VITALS
OXYGEN SATURATION: 98 % | SYSTOLIC BLOOD PRESSURE: 151 MMHG | TEMPERATURE: 97.9 F | HEART RATE: 92 BPM | DIASTOLIC BLOOD PRESSURE: 80 MMHG | HEIGHT: 61 IN | BODY MASS INDEX: 27.49 KG/M2 | RESPIRATION RATE: 16 BRPM | WEIGHT: 145.6 LBS

## 2024-03-15 DIAGNOSIS — Z13.29 THYROID DISORDER SCREEN: ICD-10-CM

## 2024-03-15 DIAGNOSIS — E78.2 HYPERLIPEMIA, MIXED: ICD-10-CM

## 2024-03-15 DIAGNOSIS — I10 ESSENTIAL HYPERTENSION: ICD-10-CM

## 2024-03-15 DIAGNOSIS — E55.9 VITAMIN D INSUFFICIENCY: ICD-10-CM

## 2024-03-15 DIAGNOSIS — H91.13 PRESBYCUSIS OF BOTH EARS: ICD-10-CM

## 2024-03-15 DIAGNOSIS — S32.000S COMPRESSION FRACTURE OF LUMBAR VERTEBRA, UNSPECIFIED LUMBAR VERTEBRAL LEVEL, SEQUELA: ICD-10-CM

## 2024-03-15 DIAGNOSIS — Z00.00 MEDICARE ANNUAL WELLNESS VISIT, SUBSEQUENT: Primary | ICD-10-CM

## 2024-03-15 DIAGNOSIS — B35.1 ONYCHOMYCOSIS: ICD-10-CM

## 2024-03-15 PROBLEM — M48.50XA VERTEBRAL COMPRESSION FRACTURE (MULTI): Status: RESOLVED | Noted: 2023-03-03 | Resolved: 2024-03-15

## 2024-03-15 PROCEDURE — 1158F ADVNC CARE PLAN TLK DOCD: CPT | Performed by: NURSE PRACTITIONER

## 2024-03-15 PROCEDURE — 1123F ACP DISCUSS/DSCN MKR DOCD: CPT | Performed by: NURSE PRACTITIONER

## 2024-03-15 PROCEDURE — 1157F ADVNC CARE PLAN IN RCRD: CPT | Performed by: NURSE PRACTITIONER

## 2024-03-15 PROCEDURE — G0439 PPPS, SUBSEQ VISIT: HCPCS | Performed by: NURSE PRACTITIONER

## 2024-03-15 PROCEDURE — 3079F DIAST BP 80-89 MM HG: CPT | Performed by: NURSE PRACTITIONER

## 2024-03-15 PROCEDURE — 1159F MED LIST DOCD IN RCRD: CPT | Performed by: NURSE PRACTITIONER

## 2024-03-15 PROCEDURE — 1170F FXNL STATUS ASSESSED: CPT | Performed by: NURSE PRACTITIONER

## 2024-03-15 PROCEDURE — 1036F TOBACCO NON-USER: CPT | Performed by: NURSE PRACTITIONER

## 2024-03-15 PROCEDURE — 3077F SYST BP >= 140 MM HG: CPT | Performed by: NURSE PRACTITIONER

## 2024-03-15 RX ORDER — BENAZEPRIL HYDROCHLORIDE 20 MG/1
20 TABLET ORAL DAILY
Qty: 90 TABLET | Refills: 1 | Status: SHIPPED | OUTPATIENT
Start: 2024-03-15 | End: 2024-09-11

## 2024-03-15 ASSESSMENT — ENCOUNTER SYMPTOMS
DEPRESSION: 0
CHILLS: 0
NAUSEA: 0
HEADACHES: 0
MYALGIAS: 0
OCCASIONAL FEELINGS OF UNSTEADINESS: 1
NERVOUS/ANXIOUS: 0
SHORTNESS OF BREATH: 0
DIZZINESS: 0
SPEECH DIFFICULTY: 0
VOMITING: 0
ABDOMINAL PAIN: 0
CONSTITUTIONAL NEGATIVE: 1
COUGH: 0
WEAKNESS: 0
LOSS OF SENSATION IN FEET: 0
SORE THROAT: 0
FEVER: 0
ARTHRALGIAS: 0
CONFUSION: 0
SLEEP DISTURBANCE: 0
ACTIVITY CHANGE: 0
PALPITATIONS: 0
APNEA: 0

## 2024-03-15 ASSESSMENT — ACTIVITIES OF DAILY LIVING (ADL)
TAKING_MEDICATION: INDEPENDENT
BATHING: INDEPENDENT
DOING_HOUSEWORK: INDEPENDENT
MANAGING_FINANCES: INDEPENDENT
DRESSING: INDEPENDENT
GROCERY_SHOPPING: INDEPENDENT

## 2024-03-15 NOTE — PROGRESS NOTES
Health Maintenance Topics with due status: Overdue       Topic Date Due    DTaP/Tdap/Td Vaccines Never done    COVID-19 Vaccine 12/14/2023    Bone Density Scan 03/15/2024     Health Maintenance Topics with due status: Not Due       Topic Last Completion Date    Diabetes Screening 08/31/2023    Lipid Panel 03/07/2024    Medicare Annual Wellness Visit (AWV) 03/15/2024     Health Maintenance Topics with due status: Completed       Topic Last Completion Date    Pneumococcal Vaccine: 65+ Years 11/29/2016    Zoster Vaccines 06/16/2023    Influenza Vaccine 09/08/2023     Health Maintenance Topics with due status: Aged Out       Topic Date Due    HIB Vaccines Aged Out    Hepatitis B Vaccines Aged Out    IPV Vaccines Aged Out    Hepatitis A Vaccines Aged Out    Meningococcal Vaccine Aged Out    Rotavirus Vaccines Aged Out    HPV Vaccines Aged Out     Health Maintenance Topics with due status: Discontinued       Topic Date Due    Irritable Bowel Syndrome Discontinued   Subjective   Reason for Visit: Harper Ruiz is an 83 y.o. female here for a Medicare Wellness visit.          Reviewed all medications by prescribing practitioner or clinical pharmacist (such as prescriptions, OTCs, herbal therapies and supplements) and documented in the medical record.    MCW visit     Decreased hearing: certain tones are harder to hear   Hard to understand lower tones.   Denies pain in ears   Some tinnitus     Podiatry referral requested. Has seen dr. Dockery for toenail thickness   Patient has difficulty time with trimming             Patient Care Team:  Kady Shane MD as PCP - General  Kady Shane MD as PCP - [employer] PCP     Review of Systems   Constitutional: Negative.  Negative for activity change, chills and fever.   HENT:  Positive for hearing loss. Negative for congestion, postnasal drip, sneezing and sore throat.    Respiratory:  Negative for apnea, cough and shortness of breath.   "  Cardiovascular:  Negative for chest pain and palpitations.   Gastrointestinal:  Negative for abdominal pain, nausea and vomiting.   Musculoskeletal:  Negative for arthralgias and myalgias.   Neurological:  Negative for dizziness, syncope, speech difficulty, weakness and headaches.   Psychiatric/Behavioral:  Negative for confusion and sleep disturbance. The patient is not nervous/anxious.        Objective   Vitals:  /80   Pulse 92   Temp 36.6 °C (97.9 °F) (Temporal)   Resp 16   Ht 1.549 m (5' 1\")   Wt 66 kg (145 lb 9.6 oz)   SpO2 98%   BMI 27.51 kg/m²       Physical Exam  Vitals reviewed.   Constitutional:       Appearance: She is obese.   Cardiovascular:      Rate and Rhythm: Normal rate and regular rhythm.      Pulses: Normal pulses.      Heart sounds: Normal heart sounds.   Pulmonary:      Effort: Pulmonary effort is normal.      Breath sounds: Normal breath sounds.   Abdominal:      General: Bowel sounds are normal.   Neurological:      Mental Status: She is alert and oriented to person, place, and time.   Psychiatric:         Mood and Affect: Mood normal.         Behavior: Behavior normal.         Assessment/Plan   Problem List Items Addressed This Visit       Essential hypertension    Current Assessment & Plan     Elevated, likely related to anxiety   Patient is anxious about a loved one in LTC facility]  Take amlodipine 10mg when home. Takes typically in the evening   Call for consistently elevated bp >140/90  JOSE F            Relevant Medications    benazepril (Lotensin) 20 mg tablet    Other Relevant Orders    Comprehensive Metabolic Panel    CBC and Auto Differential    Comprehensive Metabolic Panel    Hyperlipemia, mixed    Current Assessment & Plan     .diet, lifestyle modification, and drug therapy.   Follow up 6 months with labs           Relevant Orders    Cholesterol, LDL Direct    Lipid Panel    Lipid Panel    RESOLVED: Vertebral compression fracture (CMS/HCC)    Current Assessment & " Plan     Asymptomatic  Reevaluate in 6 months.  DEXA ordered         Relevant Orders    XR DEXA bone density    Vitamin D insufficiency    Current Assessment & Plan     Hold vitamin d supplement. She is taking ca+vit d3 with addition vitamin d 2,000IU daily   Instructed to hold single vitamin d  Check in 6 months         Relevant Orders    Vitamin D 25-Hydroxy,Total (for eval of Vitamin D levels)    Vitamin D 1,25 Dihydroxy (for eval of hypercalcemia)    Medicare annual wellness visit, subsequent - Primary    Current Assessment & Plan     PE done            Presbycusis of both ears    Current Assessment & Plan     Referral to audiology for eval         Relevant Orders    Referral to Audiology    Onychomycosis    Current Assessment & Plan     Referral to podiatry for evaluation   Follow up 6 months          Relevant Orders    Referral to Podiatry     Other Visit Diagnoses       Thyroid disorder screen        Relevant Orders    TSH with reflex to Free T4 if abnormal

## 2024-03-20 ENCOUNTER — APPOINTMENT (OUTPATIENT)
Dept: AUDIOLOGY | Facility: HOSPITAL | Age: 84
End: 2024-03-20
Payer: MEDICARE

## 2024-03-27 ENCOUNTER — CLINICAL SUPPORT (OUTPATIENT)
Dept: AUDIOLOGY | Facility: HOSPITAL | Age: 84
End: 2024-03-27
Payer: MEDICARE

## 2024-03-27 DIAGNOSIS — H90.A22 SENSORINEURAL HEARING LOSS (SNHL) OF LEFT EAR WITH RESTRICTED HEARING OF RIGHT EAR: ICD-10-CM

## 2024-03-27 DIAGNOSIS — H90.A31 MIXED CONDUCTIVE AND SENSORINEURAL HEARING LOSS OF RIGHT EAR WITH RESTRICTED HEARING OF LEFT EAR: Primary | ICD-10-CM

## 2024-03-27 PROCEDURE — 92557 COMPREHENSIVE HEARING TEST: CPT | Performed by: AUDIOLOGIST

## 2024-03-27 PROCEDURE — 92550 TYMPANOMETRY & REFLEX THRESH: CPT | Performed by: AUDIOLOGIST

## 2024-03-27 ASSESSMENT — PAIN SCALES - GENERAL: PAINLEVEL_OUTOF10: 0 - NO PAIN

## 2024-03-27 ASSESSMENT — PAIN - FUNCTIONAL ASSESSMENT: PAIN_FUNCTIONAL_ASSESSMENT: 0-10

## 2024-03-27 NOTE — LETTER
2024     Kady Shane MD  [address]  [facility], [address] [place] [id number]    Patient: Harper Ruiz   YOB: 1940   Date of Visit: 3/27/2024       Dear Dr. Kady Shane MD:    Thank you for referring Harper Ruiz to me for evaluation. Below are my notes for this consultation.  If you have questions, please do not hesitate to call me. I look forward to following your patient along with you.       Sincerely,     SAMAN Aguirre, CCC-A      CC: No Recipients  ______________________________________________________________________________________    AUDIOLOGY ADULT AUDIOMETRIC EVALUATION      Name:  Harper Ruiz  :  1940  Age:  83 y.o.  Date of Evaluation:  3/27/2024     History:  Reason for visit:  Ms. Ruiz is seen today at the request of Eulalia Isbell CNP for an evaluation of hearing.  Patient complains of Hearing Loss (Gradually progressive hearing loss, struggling to hear some speakers, raises television volume and uses closed captioning)        Previous hearing test:   from work, showing high frequency hearing loss with left ear slightly worse     Otalgia:  no    Aural Fullness:  no    Otitis Media: no    PE Tubes:  no  Other otologic surgical history:  no    Tinnitus:   yes  Laterality: bilateral  Character:  ringing  Frequency of occurrence: infrequently  Onset: longstanding  Bothersome: no  Disturbance of daily activities: no   Disturbance of sleep: no  Pulsatile: no    Dizziness:   mild with quick movements, lightheaded, possibly vertigo    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, factory - used hearing protection    Family history of hearing loss:   siblings with hearing loss, one sister with congenital hearing loss, other sister with hearing loss onset in older adulthood    Hearing aid history:       none    Other significant history: none    EVALUATION          RESULTS:    Otoscopic Evaluation:        Right ear: nearly occluding cerumen,  tympanic membrane not visualized        Left ear: mostly clear ear canal, tympanic membrane visualized     Immittance Measures: 226Hz       Right Probe Ear: Tympanogram shows normal middle ear pressure, static compliance, and ear canal volume.  Acoustic reflexes were absent.         Left Probe Ear: Tympanogram shows normal middle ear pressure, static compliance, and ear canal volume.  Acoustic reflexes were consistent with pure tone results.    Pure Tone Audiometry:  Conventional audiometry (125-8000Hz) via insert earphones with good response reliability       Right ear: mild to profound mixed hearing loss        Left ear: normal hearing sensitivity from 125-1000Hz sloping to profound high frequency sensorineural hearing loss     Speech Audiometry:        Right Ear:  Speech Reception Threshold (SRT) was obtained at 55 dBHL                 Word Recognition scores were poor at most comfortable listening level       Left Ear:  Speech Reception Threshold (SRT) was obtained at 25 dBHL                 Word Recognition scores were good at 40dBSL re: SRT    Distortion Product Otoacoustic Emissions: Assesses the cochlear outer hair cell function (6311-8055 Hz frequency range)         Right Ear:  absent          Left Ear:  present 1500Hz, absent 2000-8000Hz      IMPRESSIONS:  Today's test results are consistent with mild to profound mixed hearing loss in the right ear and moderate to severe high frequency sensorineural hearing loss in the left ear.  Her word discrimination scores were poor in the right ear and good in the left ear.  Her tympanic membrane mobility is within normal limits bilaterally.       RECOMMENDATIONS:  -Medical / Otolaryngology consultation regarding cerumen management in the right ear   -Audiologic follow-up testing after cerumen management.  -Hearing Aid consultation after cerumen management and follow-up audiogram, if cleared medically.  Patient to check with insurance regarding benefits and covered  providers.     PATIENT EDUCATION:   Discussed results and recommendations with Ms. Ruiz.  Questions were addressed and the patient was encouraged to contact our department should concerns arise.    Time:  11:05 to 11:45    SAMAN Aguirre, CCC-A  Senior Audiologist

## 2024-03-27 NOTE — PROGRESS NOTES
AUDIOLOGY ADULT AUDIOMETRIC EVALUATION      Name:  Harper Ruiz  :  1940  Age:  83 y.o.  Date of Evaluation:  3/27/2024     History:  Reason for visit:  Ms. Ruiz is seen today at the request of Eulalia Isbell CNP for an evaluation of hearing.  Patient complains of Hearing Loss (Gradually progressive hearing loss, struggling to hear some speakers, raises television volume and uses closed captioning)        Previous hearing test:   from work, showing high frequency hearing loss with left ear slightly worse     Otalgia:  no    Aural Fullness:  no    Otitis Media: no    PE Tubes:  no  Other otologic surgical history:  no    Tinnitus:   yes  Laterality: bilateral  Character:  ringing  Frequency of occurrence: infrequently  Onset: longstanding  Bothersome: no  Disturbance of daily activities: no   Disturbance of sleep: no  Pulsatile: no    Dizziness:   mild with quick movements, lightheaded, possibly vertigo    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, factory - used hearing protection    Family history of hearing loss:   siblings with hearing loss, one sister with congenital hearing loss, other sister with hearing loss onset in older adulthood    Hearing aid history:       none    Other significant history: none    EVALUATION          RESULTS:    Otoscopic Evaluation:        Right ear: nearly occluding cerumen, tympanic membrane not visualized        Left ear: mostly clear ear canal, tympanic membrane visualized     Immittance Measures: 226Hz       Right Probe Ear: Tympanogram shows normal middle ear pressure, static compliance, and ear canal volume.  Acoustic reflexes were absent.         Left Probe Ear: Tympanogram shows normal middle ear pressure, static compliance, and ear canal volume.  Acoustic reflexes were consistent with pure tone results.    Pure Tone Audiometry:  Conventional audiometry (125-8000Hz) via insert earphones with good response reliability       Right ear: mild to profound mixed hearing loss        Left  ear: normal hearing sensitivity from 125-1000Hz sloping to profound high frequency sensorineural hearing loss     Speech Audiometry:        Right Ear:  Speech Reception Threshold (SRT) was obtained at 55 dBHL                 Word Recognition scores were poor at most comfortable listening level       Left Ear:  Speech Reception Threshold (SRT) was obtained at 25 dBHL                 Word Recognition scores were good at 40dBSL re: SRT    Distortion Product Otoacoustic Emissions: Assesses the cochlear outer hair cell function (5014-5921 Hz frequency range)         Right Ear:  absent          Left Ear:  present 1500Hz, absent 2000-8000Hz      IMPRESSIONS:  Today's test results are consistent with mild to profound mixed hearing loss in the right ear and moderate to severe high frequency sensorineural hearing loss in the left ear.  Her word discrimination scores were poor in the right ear and good in the left ear.  Her tympanic membrane mobility is within normal limits bilaterally.       RECOMMENDATIONS:  -Medical / Otolaryngology consultation regarding cerumen management in the right ear   -Audiologic follow-up testing after cerumen management.  -Hearing Aid consultation after cerumen management and follow-up audiogram, if cleared medically.  Patient to check with insurance regarding benefits and covered providers.     PATIENT EDUCATION:   Discussed results and recommendations with Ms. Ruiz.  Questions were addressed and the patient was encouraged to contact our department should concerns arise.    Time:  11:05 to 11:45    SAMAN Aguirre, CCC-A  Senior Audiologist

## 2024-03-28 ENCOUNTER — TELEPHONE (OUTPATIENT)
Dept: PRIMARY CARE | Facility: CLINIC | Age: 84
End: 2024-03-28
Payer: MEDICARE

## 2024-03-28 NOTE — TELEPHONE ENCOUNTER
Patient called in and stated that she needed an ear irrigation before she can get fitted for a hearing aid. Can I schedule an ear irrigation?

## 2024-04-02 ENCOUNTER — CLINICAL SUPPORT (OUTPATIENT)
Dept: PRIMARY CARE | Facility: CLINIC | Age: 84
End: 2024-04-02
Payer: MEDICARE

## 2024-04-02 DIAGNOSIS — H61.23 IMPACTED CERUMEN OF BOTH EARS: ICD-10-CM

## 2024-04-02 PROCEDURE — 69209 REMOVE IMPACTED EAR WAX UNI: CPT | Performed by: FAMILY MEDICINE

## 2024-04-02 NOTE — PROGRESS NOTES
Patient ID: Harper Ruiz is a 83 y.o. female.    Ear Cerumen Removal    Date/Time: 4/2/2024 3:34 PM    Performed by: Scott Szymanski MA  Authorized by: Kady Shane MD    Consent:     Risks discussed:  Bleeding, dizziness and incomplete removal  Procedure details:     Location:  L ear and R ear    Procedure type: irrigation      Procedure outcomes: cerumen removed    Post-procedure details:     Inspection:  Ear canal clear    Procedure completion:  Tolerated  Patient in today for ear irrigation. Patient reports at her ear doctor they stated she has impacted cerumen bilaterally that's needs to be removed prior to fitting her for new hearing aids.

## 2024-04-16 ENCOUNTER — APPOINTMENT (OUTPATIENT)
Dept: RADIOLOGY | Facility: CLINIC | Age: 84
End: 2024-04-16
Payer: MEDICARE

## 2024-04-19 ENCOUNTER — HOSPITAL ENCOUNTER (OUTPATIENT)
Dept: RADIOLOGY | Facility: CLINIC | Age: 84
Discharge: HOME | End: 2024-04-19
Payer: MEDICARE

## 2024-04-19 DIAGNOSIS — S32.000S COMPRESSION FRACTURE OF LUMBAR VERTEBRA, UNSPECIFIED LUMBAR VERTEBRAL LEVEL, SEQUELA: ICD-10-CM

## 2024-04-19 PROCEDURE — 77080 DXA BONE DENSITY AXIAL: CPT

## 2024-04-19 PROCEDURE — 77080 DXA BONE DENSITY AXIAL: CPT | Performed by: RADIOLOGY

## 2024-08-20 ENCOUNTER — APPOINTMENT (OUTPATIENT)
Dept: UROLOGY | Facility: CLINIC | Age: 84
End: 2024-08-20
Payer: MEDICARE

## 2024-08-20 VITALS — HEIGHT: 61 IN | BODY MASS INDEX: 26.43 KG/M2 | WEIGHT: 140 LBS

## 2024-08-20 DIAGNOSIS — R39.9 LOWER URINARY TRACT SYMPTOMS (LUTS): ICD-10-CM

## 2024-08-20 DIAGNOSIS — R39.15 URGENCY OF URINATION: Primary | ICD-10-CM

## 2024-08-20 PROCEDURE — 99214 OFFICE O/P EST MOD 30 MIN: CPT | Performed by: STUDENT IN AN ORGANIZED HEALTH CARE EDUCATION/TRAINING PROGRAM

## 2024-08-20 PROCEDURE — 1126F AMNT PAIN NOTED NONE PRSNT: CPT | Performed by: STUDENT IN AN ORGANIZED HEALTH CARE EDUCATION/TRAINING PROGRAM

## 2024-08-20 PROCEDURE — 87086 URINE CULTURE/COLONY COUNT: CPT

## 2024-08-20 PROCEDURE — 1123F ACP DISCUSS/DSCN MKR DOCD: CPT | Performed by: STUDENT IN AN ORGANIZED HEALTH CARE EDUCATION/TRAINING PROGRAM

## 2024-08-20 PROCEDURE — 1157F ADVNC CARE PLAN IN RCRD: CPT | Performed by: STUDENT IN AN ORGANIZED HEALTH CARE EDUCATION/TRAINING PROGRAM

## 2024-08-20 PROCEDURE — 1159F MED LIST DOCD IN RCRD: CPT | Performed by: STUDENT IN AN ORGANIZED HEALTH CARE EDUCATION/TRAINING PROGRAM

## 2024-08-20 ASSESSMENT — PAIN SCALES - GENERAL: PAINLEVEL: 0-NO PAIN

## 2024-08-20 NOTE — PROGRESS NOTES
"HISTORY OF PRESENT ILLNESS:  Harper Ruiz is a 84 y.o. female who presents today for a follow up visit. She is worried about an infection. Having some dysuria         Past Medical History  She has a past medical history of Abdominal distension (gaseous) (07/24/2020), Abdominal distension (gaseous) (07/24/2020), Acute candidiasis of vulva and vagina, Arthritis, Cancer (Multi), Cataract, Encounter for immunization, Essential (primary) hypertension, Generalized abdominal pain (07/20/2020), HL (hearing loss), Osteoporosis, Personal history of non-Hodgkin lymphomas, Personal history of other diseases of the female genital tract, Personal history of other specified conditions, Personal history of urinary (tract) infections, Pleurodynia (03/02/2020), Unspecified urinary incontinence, and Visual impairment.    Surgical History  She has a past surgical history that includes Breast surgery (04/08/2017); Other surgical history (11/14/2019); and Other surgical history (11/14/2019).     Social History  She reports that she has never smoked. She has never used smokeless tobacco. She reports that she does not drink alcohol and does not use drugs.    Family History  Family History   Problem Relation Name Age of Onset    Kidney disease Mother      Diabetes Mother      Alzheimer's disease Father          Allergies  Acetaminophen-codeine, Hydrocodone-acetaminophen, Nifedipine, and Oxycodone-acetaminophen      A comprehensive 10+ review of systems was negative except for: see hpi                          PHYSICAL EXAMINATION:  BP Readings from Last 3 Encounters:   03/15/24 151/80   09/08/23 127/77   03/06/23 138/88      Wt Readings from Last 3 Encounters:   08/20/24 63.5 kg (140 lb)   03/15/24 66 kg (145 lb 9.6 oz)   09/08/23 65.5 kg (144 lb 6.4 oz)      BMI: Estimated body mass index is 26.45 kg/m² as calculated from the following:    Height as of this encounter: 1.549 m (5' 1\").    Weight as of this encounter: 63.5 kg (140 " "lb).  BSA: Estimated body surface area is 1.65 meters squared as calculated from the following:    Height as of this encounter: 1.549 m (5' 1\").    Weight as of this encounter: 63.5 kg (140 lb).  HEENT: Normocephalic, atraumatic, PER EOMI, nonicteric, trachea normal, thyroid normal, oropharynx normal.  CARDIAC: regular rate & rhythm, S1 & S2 normal.  No heaves, thrills, gallops or murmurs.  LUNGS: Clear to auscultation, no spinal or CV tenderness.  EXTREMITIES: No evidence of cyanosis, clubbing or edema.               Assessment:  84-year-old with stage III uterovaginal prolapse     POP:  s/p APR with levator myorrphay   Continue estrogen  f/up in 1 year       LUTS:  Urine culture ordered, will call with results          All questions and concerns were answered and addressed.  The patient expressed understanding and agrees with the plan.     Amauri Nina MD    Scribe Attestation  By signing my name below, I, Roro Rodríguez, [place]rib[place]   attest that this documentation has been prepared under the direction and in the presence of Amauri Nina MD.  "

## 2024-08-22 LAB — BACTERIA UR CULT: NO GROWTH

## 2024-09-03 ENCOUNTER — TELEPHONE (OUTPATIENT)
Dept: PRIMARY CARE | Facility: CLINIC | Age: 84
End: 2024-09-03
Payer: MEDICARE

## 2024-09-03 DIAGNOSIS — I10 ESSENTIAL HYPERTENSION: ICD-10-CM

## 2024-09-03 NOTE — TELEPHONE ENCOUNTER
Rx Refill Request Telephone Encounter    Name:  Harper Ruiz  :  [id number]  Medication Name:      amLODIPine (Norvasc) 10 mg tablet   Route: Take 1 tablet (10 mg) by mouth once daily.     Specific Pharmacy location:  [facility] - [address] [[id number]]   Date of last appointment:  3/15/24  Date of next appointment:  24  Best number to reach patient: [phone]

## 2024-09-04 RX ORDER — AMLODIPINE BESYLATE 10 MG/1
10 TABLET ORAL DAILY
Qty: 90 TABLET | Refills: 3 | Status: SHIPPED | OUTPATIENT
Start: 2024-09-04 | End: 2025-09-04

## 2024-09-12 ENCOUNTER — LAB (OUTPATIENT)
Dept: LAB | Facility: LAB | Age: 84
End: 2024-09-12
Payer: MEDICARE

## 2024-09-12 DIAGNOSIS — E78.2 HYPERLIPEMIA, MIXED: ICD-10-CM

## 2024-09-12 DIAGNOSIS — E55.9 VITAMIN D INSUFFICIENCY: ICD-10-CM

## 2024-09-12 DIAGNOSIS — Z13.29 THYROID DISORDER SCREEN: ICD-10-CM

## 2024-09-12 DIAGNOSIS — I10 ESSENTIAL HYPERTENSION: ICD-10-CM

## 2024-09-12 LAB
25(OH)D3 SERPL-MCNC: 60 NG/ML (ref 30–100)
ALBUMIN SERPL BCP-MCNC: 4.4 G/DL (ref 3.4–5)
ALP SERPL-CCNC: 73 U/L (ref 33–136)
ALT SERPL W P-5'-P-CCNC: 26 U/L (ref 7–45)
ANION GAP SERPL CALC-SCNC: 9 MMOL/L (ref 10–20)
AST SERPL W P-5'-P-CCNC: 36 U/L (ref 9–39)
BASOPHILS # BLD AUTO: 0.04 X10*3/UL (ref 0–0.1)
BASOPHILS NFR BLD AUTO: 0.6 %
BILIRUB SERPL-MCNC: 0.7 MG/DL (ref 0–1.2)
BUN SERPL-MCNC: 12 MG/DL (ref 6–23)
CALCIUM SERPL-MCNC: 9.2 MG/DL (ref 8.6–10.3)
CHLORIDE SERPL-SCNC: 104 MMOL/L (ref 98–107)
CHOLEST SERPL-MCNC: 166 MG/DL (ref 0–199)
CHOLESTEROL/HDL RATIO: 2.1
CO2 SERPL-SCNC: 31 MMOL/L (ref 21–32)
CREAT SERPL-MCNC: 0.73 MG/DL (ref 0.5–1.05)
EGFRCR SERPLBLD CKD-EPI 2021: 81 ML/MIN/1.73M*2
EOSINOPHIL # BLD AUTO: 0.1 X10*3/UL (ref 0–0.4)
EOSINOPHIL NFR BLD AUTO: 1.6 %
ERYTHROCYTE [DISTWIDTH] IN BLOOD BY AUTOMATED COUNT: 14.1 % (ref 11.5–14.5)
GLUCOSE SERPL-MCNC: 92 MG/DL (ref 74–99)
HCT VFR BLD AUTO: 45.8 % (ref 36–46)
HDLC SERPL-MCNC: 78.7 MG/DL
HGB BLD-MCNC: 14.8 G/DL (ref 12–16)
IMM GRANULOCYTES # BLD AUTO: 0.02 X10*3/UL (ref 0–0.5)
IMM GRANULOCYTES NFR BLD AUTO: 0.3 % (ref 0–0.9)
LDLC SERPL CALC-MCNC: 68 MG/DL
LDLC SERPL DIRECT ASSAY-MCNC: 68 MG/DL (ref 0–129)
LYMPHOCYTES # BLD AUTO: 2.93 X10*3/UL (ref 0.8–3)
LYMPHOCYTES NFR BLD AUTO: 46.6 %
MCH RBC QN AUTO: 30.2 PG (ref 26–34)
MCHC RBC AUTO-ENTMCNC: 32.3 G/DL (ref 32–36)
MCV RBC AUTO: 94 FL (ref 80–100)
MONOCYTES # BLD AUTO: 0.57 X10*3/UL (ref 0.05–0.8)
MONOCYTES NFR BLD AUTO: 9.1 %
NEUTROPHILS # BLD AUTO: 2.63 X10*3/UL (ref 1.6–5.5)
NEUTROPHILS NFR BLD AUTO: 41.8 %
NON HDL CHOLESTEROL: 87 MG/DL (ref 0–149)
NRBC BLD-RTO: 0 /100 WBCS (ref 0–0)
PLATELET # BLD AUTO: 174 X10*3/UL (ref 150–450)
POTASSIUM SERPL-SCNC: 3.8 MMOL/L (ref 3.5–5.3)
PROT SERPL-MCNC: 7.2 G/DL (ref 6.4–8.2)
RBC # BLD AUTO: 4.9 X10*6/UL (ref 4–5.2)
SODIUM SERPL-SCNC: 140 MMOL/L (ref 136–145)
TRIGL SERPL-MCNC: 96 MG/DL (ref 0–149)
TSH SERPL-ACNC: 2.9 MIU/L (ref 0.44–3.98)
VLDL: 19 MG/DL (ref 0–40)
WBC # BLD AUTO: 6.3 X10*3/UL (ref 4.4–11.3)

## 2024-09-12 PROCEDURE — 85025 COMPLETE CBC W/AUTO DIFF WBC: CPT

## 2024-09-12 PROCEDURE — 36415 COLL VENOUS BLD VENIPUNCTURE: CPT

## 2024-09-12 PROCEDURE — 82306 VITAMIN D 25 HYDROXY: CPT

## 2024-09-12 PROCEDURE — 83721 ASSAY OF BLOOD LIPOPROTEIN: CPT

## 2024-09-12 PROCEDURE — 82652 VIT D 1 25-DIHYDROXY: CPT

## 2024-09-14 LAB — 1,25(OH)2D SERPL-MCNC: 82.7 PG/ML (ref 19.9–79.3)

## 2024-09-18 PROBLEM — B35.1 ONYCHOMYCOSIS: Status: RESOLVED | Noted: 2024-03-15 | Resolved: 2024-09-18

## 2024-09-18 NOTE — PROGRESS NOTES
Subjective   Patient ID: Harper Ruiz is a 84 y.o. female who presents for Follow-up (6 month follow up ).  HPI  Patient presents today for follow up labs and chronic conditions.  Has chronic post nasal drip. Would like ears checked.   Patient otherwise feels well. No other complaints or concerns.    The patient's relevant past medical, surgical, family, and social history was reviewed in [facility].  All pertinent lab work and results for this visit were reviewed with patient.    Lab on 09/12/2024   Component Date Value Ref Range Status    LDL, Direct 09/12/2024 68  0 - 129 mg/dL Final    Cholesterol 09/12/2024 166  0 - 199 mg/dL Final          Age      Desirable   Borderline High   High     0-19 Y     0 - 169       170 - 199     >/= 200    20-24 Y     0 - 189       190 - 224     >/= 225         >24 Y     0 - 199       200 - 239     >/= 240   **All ranges are based on fasting samples. Specific   therapeutic targets will vary based on patient-specific   cardiac risk.    Pediatric guidelines reference:Pediatrics 2011, 128(S5).Adult guidelines reference: NCEP ATPIII Guidelines,SHAHID 2001, 258:2486-97    Venipuncture immediately after or during the administration of Metamizole may lead to falsely low results. Testing should be performed immediately prior to Metamizole dosing.    HDL-Cholesterol 09/12/2024 78.7  mg/dL Final      Age       Very Low   Low     Normal    High    0-19 Y    < 35      < 40     40-45     ----  20-24 Y    ----     < 40      >45      ----        >24 Y      ----     < 40     40-60      >60      Cholesterol/HDL Ratio 09/12/2024 2.1   Final      Ref Values  Desirable  < 3.4  High Risk  > 5.0    LDL Calculated 09/12/2024 68  <=99 mg/dL Final                                Near   Borderline      AGE      Desirable  Optimal    High     High     Very High     0-19 Y     0 - 109     ---    110-129   >/= 130     ----    20-24 Y     0 - 119     ---    120-159   >/= 160     ----      >24 Y     0 -  99   100-129   130-159   160-189     >/=190      VLDL 09/12/2024 19  0 - 40 mg/dL Final    Triglycerides 09/12/2024 96  0 - 149 mg/dL Final       Age         Desirable   Borderline High   High     Very High   0 D-90 D    19 - 174         ----         ----        ----  91 D- 9 Y     0 -  74        75 -  99     >/= 100      ----    10-19 Y     0 -  89        90 - 129     >/= 130      ----    20-24 Y     0 - 114       115 - 149     >/= 150      ----         >24 Y     0 - 149       150 - 199    200- 499    >/= 500    Venipuncture immediately after or during the administration of Metamizole may lead to falsely low results. Testing should be performed immediately prior to Metamizole dosing.    Non HDL Cholesterol 09/12/2024 87  0 - 149 mg/dL Final          Age       Desirable   Borderline High   High     Very High     0-19 Y     0 - 119       120 - 144     >/= 145    >/= 160    20-24 Y     0 - 149       150 - 189     >/= 190      ----         >24 Y    30 mg/dL above LDL Cholesterol goal      Glucose 09/12/2024 92  74 - 99 mg/dL Final    Sodium 09/12/2024 140  136 - 145 mmol/L Final    Potassium 09/12/2024 3.8  3.5 - 5.3 mmol/L Final    Chloride 09/12/2024 104  98 - 107 mmol/L Final    Bicarbonate 09/12/2024 31  21 - 32 mmol/L Final    Anion Gap 09/12/2024 9 (L)  10 - 20 mmol/L Final    Urea Nitrogen 09/12/2024 12  6 - 23 mg/dL Final    Creatinine 09/12/2024 0.73  0.50 - 1.05 mg/dL Final    eGFR 09/12/2024 81  >60 mL/min/1.73m*2 Final    Calculations of estimated GFR are performed using the 2021 CKD-EPI Study Refit equation without the race variable for the IDMS-Traceable creatinine methods.  https://jasn.asnjournals.org/content/early/2021/09/22/ASN.9189053042    Calcium 09/12/2024 9.2  8.6 - 10.3 mg/dL Final    Albumin 09/12/2024 4.4  3.4 - 5.0 g/dL Final    Alkaline Phosphatase 09/12/2024 73  33 - 136 U/L Final    Total Protein 09/12/2024 7.2  6.4 - 8.2 g/dL Final    AST 09/12/2024 36  9 - 39 U/L Final    Bilirubin, Total 09/12/2024  0.7  0.0 - 1.2 mg/dL Final    ALT 09/12/2024 26  7 - 45 U/L Final    Patients treated with Sulfasalazine may generate falsely decreased results for ALT.    Vitamin D, 25-Hydroxy, Total 09/12/2024 60  30 - 100 ng/mL Final    WBC 09/12/2024 6.3  4.4 - 11.3 x10*3/uL Final    nRBC 09/12/2024 0.0  0.0 - 0.0 /100 WBCs Final    RBC 09/12/2024 4.90  4.00 - 5.20 x10*6/uL Final    Hemoglobin 09/12/2024 14.8  12.0 - 16.0 g/dL Final    Hematocrit 09/12/2024 45.8  36.0 - 46.0 % Final    MCV 09/12/2024 94  80 - 100 fL Final    MCH 09/12/2024 30.2  26.0 - 34.0 pg Final    MCHC 09/12/2024 32.3  32.0 - 36.0 g/dL Final    RDW 09/12/2024 14.1  11.5 - 14.5 % Final    Platelets 09/12/2024 174  150 - 450 x10*3/uL Final    Neutrophils % 09/12/2024 41.8  40.0 - 80.0 % Final    Immature Granulocytes %, Automated 09/12/2024 0.3  0.0 - 0.9 % Final    Immature Granulocyte Count (IG) includes promyelocytes, myelocytes and metamyelocytes but does not include bands. Percent differential counts (%) should be interpreted in the context of the absolute cell counts (cells/UL).    Lymphocytes % 09/12/2024 46.6  13.0 - 44.0 % Final    Monocytes % 09/12/2024 9.1  2.0 - 10.0 % Final    Eosinophils % 09/12/2024 1.6  0.0 - 6.0 % Final    Basophils % 09/12/2024 0.6  0.0 - 2.0 % Final    Neutrophils Absolute 09/12/2024 2.63  1.60 - 5.50 x10*3/uL Final    Percent differential counts (%) should be interpreted in the context of the absolute cell counts (cells/uL).    Immature Granulocytes Absolute, Au* 09/12/2024 0.02  0.00 - 0.50 x10*3/uL Final    Lymphocytes Absolute 09/12/2024 2.93  0.80 - 3.00 x10*3/uL Final    Monocytes Absolute 09/12/2024 0.57  0.05 - 0.80 x10*3/uL Final    Eosinophils Absolute 09/12/2024 0.10  0.00 - 0.40 x10*3/uL Final    Basophils Absolute 09/12/2024 0.04  0.00 - 0.10 x10*3/uL Final    Thyroid Stimulating Hormone 09/12/2024 2.90  0.44 - 3.98 mIU/L Final    Vit D, 1,25-Dihydroxy 09/12/2024 82.7 (H)  19.9 - 79.3 pg/mL Final     "INTERPRETIVE INFORMATION: Vitamin D, 1,25-Dihydroxy    This test is primarily indicated during patient evaluation for   hypercalcemia and renal failure. A normal result does not rule out   Vitamin D deficiency. The recommended test for diagnosing Vitamin   D deficiency is Vitamin D 25-hydroxy.  Performed By: [email or web address]  [address] [place] [id number]  : George Tate MD, PhD  CLIA Number: [id number]   Office Visit on 08/20/2024   Component Date Value Ref Range Status    Urine Culture 08/20/2024 No growth   Final           Review of Systems   A complete review of systems was performed and all systems were normal except what is noted in the HPI.        Objective   /78   Pulse 70   Temp 36.6 °C (97.8 °F) (Temporal)   Resp 16   Ht 1.549 m (5' 1\")   Wt 65.4 kg (144 lb 3.2 oz)   SpO2 98%   BMI 27.25 kg/m²    Physical Exam  Constitutional:       Appearance: Normal appearance.   HENT:      Head: Normocephalic and atraumatic.      Right Ear: There is impacted cerumen.      Left Ear: There is impacted cerumen.   Neck:      Vascular: No carotid bruit.   Cardiovascular:      Rate and Rhythm: Normal rate and regular rhythm.      Heart sounds: Normal heart sounds.   Pulmonary:      Effort: Pulmonary effort is normal.      Breath sounds: Normal breath sounds. No wheezing, rhonchi or rales.   Abdominal:      General: Abdomen is flat. Bowel sounds are normal.      Palpations: Abdomen is soft.      Tenderness: There is no abdominal tenderness. There is no guarding.   Musculoskeletal:         General: Normal range of motion.      Right lower leg: No edema.      Left lower leg: No edema.   Skin:     General: Skin is dry.   Neurological:      General: No focal deficit present.      Mental Status: She is alert and oriented to person, place, and time.   Psychiatric:         Mood and Affect: Mood normal.         Behavior: Behavior normal.         Thought Content: Thought content " normal.         Health Maintenance Due   Topic Date Due    DTaP/Tdap/Td Vaccines (1 - Tdap) Never done    Diabetes Screening  08/31/2024    COVID-19 Vaccine (5 - 2023-24 season) 09/01/2024        Assessment/Plan   Problem List Items Addressed This Visit       Essential hypertension - Primary     Well controlled. Continue current medicine and recheck in 6 months.           Relevant Medications    benazepril (Lotensin) 20 mg tablet    Other Relevant Orders    Comprehensive Metabolic Panel    Hyperlipemia, mixed     Well controlled. Continue current medicine and recheck in 6 months.           Relevant Medications    pravastatin (Pravachol) 20 mg tablet    Other Relevant Orders    Cholesterol, LDL Direct    Lipid Panel    Vitamin D insufficiency     Well controlled. Continue current medicine and recheck in 6 months.           Relevant Orders    Vitamin D 25-Hydroxy,Total (for eval of Vitamin D levels)    Post-nasal drainage    Relevant Medications    fluticasone (Flonase) 50 mcg/actuation nasal spray     Other Visit Diagnoses       Bilateral impacted cerumen        Relevant Orders    Ear cerumen removal              Patient understands and agrees with care plan.           Kady Shane MD

## 2024-09-20 ENCOUNTER — APPOINTMENT (OUTPATIENT)
Dept: PRIMARY CARE | Facility: CLINIC | Age: 84
End: 2024-09-20
Payer: MEDICARE

## 2024-09-20 VITALS
SYSTOLIC BLOOD PRESSURE: 127 MMHG | OXYGEN SATURATION: 98 % | TEMPERATURE: 97.8 F | BODY MASS INDEX: 27.23 KG/M2 | DIASTOLIC BLOOD PRESSURE: 78 MMHG | HEART RATE: 70 BPM | WEIGHT: 144.2 LBS | HEIGHT: 61 IN | RESPIRATION RATE: 16 BRPM

## 2024-09-20 DIAGNOSIS — H61.23 BILATERAL IMPACTED CERUMEN: ICD-10-CM

## 2024-09-20 DIAGNOSIS — E55.9 VITAMIN D INSUFFICIENCY: ICD-10-CM

## 2024-09-20 DIAGNOSIS — R09.82 POST-NASAL DRAINAGE: ICD-10-CM

## 2024-09-20 DIAGNOSIS — E78.2 HYPERLIPEMIA, MIXED: ICD-10-CM

## 2024-09-20 DIAGNOSIS — I10 ESSENTIAL HYPERTENSION: Primary | ICD-10-CM

## 2024-09-20 PROCEDURE — 1157F ADVNC CARE PLAN IN RCRD: CPT | Performed by: FAMILY MEDICINE

## 2024-09-20 PROCEDURE — 1160F RVW MEDS BY RX/DR IN RCRD: CPT | Performed by: FAMILY MEDICINE

## 2024-09-20 PROCEDURE — 1036F TOBACCO NON-USER: CPT | Performed by: FAMILY MEDICINE

## 2024-09-20 PROCEDURE — 1123F ACP DISCUSS/DSCN MKR DOCD: CPT | Performed by: FAMILY MEDICINE

## 2024-09-20 PROCEDURE — 3074F SYST BP LT 130 MM HG: CPT | Performed by: FAMILY MEDICINE

## 2024-09-20 PROCEDURE — G2211 COMPLEX E/M VISIT ADD ON: HCPCS | Performed by: FAMILY MEDICINE

## 2024-09-20 PROCEDURE — 99214 OFFICE O/P EST MOD 30 MIN: CPT | Performed by: FAMILY MEDICINE

## 2024-09-20 PROCEDURE — 3078F DIAST BP <80 MM HG: CPT | Performed by: FAMILY MEDICINE

## 2024-09-20 PROCEDURE — 1159F MED LIST DOCD IN RCRD: CPT | Performed by: FAMILY MEDICINE

## 2024-09-20 RX ORDER — PRAVASTATIN SODIUM 20 MG/1
20 TABLET ORAL NIGHTLY
Qty: 90 TABLET | Refills: 3 | Status: SHIPPED | OUTPATIENT
Start: 2024-09-20 | End: 2025-09-20

## 2024-09-20 RX ORDER — FLUTICASONE PROPIONATE 50 MCG
1 SPRAY, SUSPENSION (ML) NASAL DAILY
Qty: 16 G | Refills: 5 | Status: SHIPPED | OUTPATIENT
Start: 2024-09-20 | End: 2025-09-20

## 2024-09-20 RX ORDER — BENAZEPRIL HYDROCHLORIDE 20 MG/1
20 TABLET ORAL DAILY
Qty: 90 TABLET | Refills: 3 | Status: SHIPPED | OUTPATIENT
Start: 2024-09-20 | End: 2025-09-15

## 2024-09-27 ENCOUNTER — TELEPHONE (OUTPATIENT)
Dept: PRIMARY CARE | Facility: CLINIC | Age: 84
End: 2024-09-27
Payer: MEDICARE

## 2024-09-27 DIAGNOSIS — E78.2 HYPERLIPEMIA, MIXED: ICD-10-CM

## 2024-09-27 RX ORDER — PRAVASTATIN SODIUM 20 MG/1
20 TABLET ORAL NIGHTLY
Qty: 90 TABLET | Refills: 3 | Status: SHIPPED | OUTPATIENT
Start: 2024-09-27 | End: 2025-09-27

## 2024-09-27 NOTE — TELEPHONE ENCOUNTER
Rx Refill Request Telephone Encounter    Name:  Harper Ruiz  :  [id number]  Medication Name:    Voicemail from  at 1:35 pm  Patient requesting this to be sent to [facility] instead of      pravastatin (Pravachol) 20 mg tablet   Route: Take 1 tablet (20 mg) by mouth once daily at bedtime.      Specific Pharmacy location:  [facility] [address] AT Portal to Registered [facility] Sites     Date of last appointment:  24  Date of next appointment:  3/28/25  Best number to reach patient: [phone]

## 2024-10-04 DIAGNOSIS — E78.2 HYPERLIPEMIA, MIXED: ICD-10-CM

## 2024-10-04 DIAGNOSIS — I10 ESSENTIAL HYPERTENSION: ICD-10-CM

## 2024-10-04 RX ORDER — BENAZEPRIL HYDROCHLORIDE 20 MG/1
20 TABLET ORAL DAILY
Qty: 90 TABLET | Refills: 3 | Status: SHIPPED | OUTPATIENT
Start: 2024-10-04 | End: 2025-09-29

## 2024-10-04 RX ORDER — PRAVASTATIN SODIUM 20 MG/1
20 TABLET ORAL NIGHTLY
Qty: 90 TABLET | Refills: 3 | Status: SHIPPED | OUTPATIENT
Start: 2024-10-04 | End: 2025-10-04

## 2024-12-13 ENCOUNTER — TELEPHONE (OUTPATIENT)
Dept: PRIMARY CARE | Facility: CLINIC | Age: 84
End: 2024-12-13
Payer: MEDICARE

## 2024-12-13 DIAGNOSIS — I10 ESSENTIAL HYPERTENSION: ICD-10-CM

## 2024-12-13 RX ORDER — AMLODIPINE BESYLATE 10 MG/1
10 TABLET ORAL DAILY
Qty: 30 TABLET | Refills: 0 | Status: SHIPPED | OUTPATIENT
Start: 2024-12-13 | End: 2025-01-12

## 2024-12-13 NOTE — TELEPHONE ENCOUNTER
Harper called today because her amlodipine has not arrived from her mail order.  I called [facility] and her medication is still in transit.      Harper has been out of amlodipine for about 1 week.  She has been taking an extra Benazepril 20 mg tablet every other day in the afternoon.      Is this ok?  There is no ETA on when the amlodipine will be delivered.  Do you want to call in a short supply of Amlodipine 10 mg to the [facility] in [place]?

## 2025-03-14 LAB
25(OH)D3+25(OH)D2 SERPL-MCNC: 39 NG/ML (ref 30–100)
ALBUMIN SERPL-MCNC: 4.5 G/DL (ref 3.6–5.1)
ALP SERPL-CCNC: 78 U/L (ref 37–153)
ALT SERPL-CCNC: 17 U/L (ref 6–29)
ANION GAP SERPL CALCULATED.4IONS-SCNC: 10 MMOL/L (CALC) (ref 7–17)
AST SERPL-CCNC: 29 U/L (ref 10–35)
BILIRUB SERPL-MCNC: 0.8 MG/DL (ref 0.2–1.2)
BUN SERPL-MCNC: 13 MG/DL (ref 7–25)
CALCIUM SERPL-MCNC: 9.7 MG/DL (ref 8.6–10.4)
CHLORIDE SERPL-SCNC: 104 MMOL/L (ref 98–110)
CHOLEST SERPL-MCNC: 166 MG/DL
CHOLEST/HDLC SERPL: 1.9 (CALC)
CO2 SERPL-SCNC: 30 MMOL/L (ref 20–32)
CREAT SERPL-MCNC: 0.62 MG/DL (ref 0.6–0.95)
EGFRCR SERPLBLD CKD-EPI 2021: 87 ML/MIN/1.73M2
GLUCOSE SERPL-MCNC: 93 MG/DL (ref 65–99)
HDLC SERPL-MCNC: 86 MG/DL
LDLC SERPL CALC-MCNC: 62 MG/DL (CALC)
LDLC SERPL DIRECT ASSAY-MCNC: 68 MG/DL
NONHDLC SERPL-MCNC: 80 MG/DL (CALC)
POTASSIUM SERPL-SCNC: 3.8 MMOL/L (ref 3.5–5.3)
PROT SERPL-MCNC: 7.4 G/DL (ref 6.1–8.1)
SODIUM SERPL-SCNC: 144 MMOL/L (ref 135–146)
TRIGL SERPL-MCNC: 93 MG/DL

## 2025-03-24 RX ORDER — INFLUENZA A VIRUS A/VICTORIA/4897/2022 IVR-238 (H1N1) ANTIGEN (FORMALDEHYDE INACTIVATED), INFLUENZA A VIRUS A/THAILAND/8/2022 IVR-237 (H3N2) ANTIGEN (FORMALDEHYDE INACTIVATED), INFLUENZA B VIRUS B/AUSTRIA/1359417/2021 BVR-26 ANTIGEN (FORMALDEHYDE INACTIVATED) 15; 15; 15 UG/.5ML; UG/.5ML; UG/.5ML
INJECTION, SUSPENSION INTRAMUSCULAR
COMMUNITY
Start: 2024-09-16 | End: 2025-03-28 | Stop reason: WASHOUT

## 2025-03-24 NOTE — ASSESSMENT & PLAN NOTE
Medical Wellness exam done.   Prevnar 13 11/16  Pneumovax 23 10/15  RSV 10/23  Shingrix series complete.   Nonsmoker  Not on opioids  Depression Screening  Depression screening completed using the PHQ-2 questions, with results documented in the chart/encounter (~5min).  (See Rooming Screening section for documentation, and/or progress note for additional information)

## 2025-03-24 NOTE — PROGRESS NOTES
Subjective :  Chief Complaint: Harper Ruiz is an 84 y.o. female here for an annual Medicare Wellness visit, annual physical, and follow up labs and chronic conditions.  Lives with son. Very limited driving.    Patient feels well. No other complaints or concerns.    I have reviewed and reconciled the medication list with the patient today.    Current Outpatient Medications:     benazepril (Lotensin) 20 mg tablet, Take 1 tablet (20 mg) by mouth once daily., Disp: 90 tablet, Rfl: 3    coQ10, ubiquinol, 100 mg capsule, Take 1 capsule (100 mg) by mouth once daily., Disp: , Rfl:     Fluad Triv 2024-25,65y up,,PF, 45 mcg (15 mcg x 3)/0.5 mL syringe, , Disp: , Rfl:     pravastatin (Pravachol) 20 mg tablet, Take 1 tablet (20 mg) by mouth once daily at bedtime., Disp: 90 tablet, Rfl: 3    psyllium husk, with sugar, (Metamucil, with sugar,) 3.4 gram/12 gram powder, Take by mouth., Disp: , Rfl:     vit A/vit C/vit E/zinc/copper (PRESERVISION AREDS ORAL), Take 2 capsules by mouth once daily., Disp: , Rfl:     amLODIPine (Norvasc) 10 mg tablet, Take 1 tablet (10 mg) by mouth once daily., Disp: 30 tablet, Rfl: 3    calcium carbonate-vitamin D3 600 mg-5 mcg (200 unit) tablet, Take 1 tablet by mouth 2 times a day. (Patient not taking: Reported on 3/28/2025), Disp: , Rfl:     fluticasone (Flonase) 50 mcg/actuation nasal spray, Administer 1 spray into each nostril once daily. Shake gently. Before first use, prime pump. After use, clean tip and replace cap. (Patient not taking: Reported on 3/28/2025), Disp: 16 g, Rfl: 5    L. acidophilus/Bifid. animalis 32 billion cell capsule, Take by mouth. (Patient not taking: Reported on 3/28/2025), Disp: , Rfl:     The patient's relevant past medical, surgical, family and social history was reviewed in [facility].  All pertinent lab work and results for this visit were reviewed with patient.    Orders Only on 03/13/2025   Component Date Value Ref Range Status    CHOLESTEROL, TOTAL 03/13/2025 166  <200  mg/dL Final    HDL CHOLESTEROL 03/13/2025 86  > OR = 50 mg/dL Final    TRIGLYCERIDES 03/13/2025 93  <150 mg/dL Final    LDL-CHOLESTEROL 03/13/2025 62  mg/dL (calc) Final    Comment: Reference range: <100     Desirable range <100 mg/dL for primary prevention;    <70 mg/dL for patients with CHD or diabetic patients   with > or = 2 CHD risk factors.     LDL-C is now calculated using the Anabella   calculation, which is a validated novel method providing   better accuracy than the Friedewald equation in the   estimation of LDL-C.   Justo MAX et al. SHAHID. 2013;310(19): 6670-4785   (http://education.Flipzu.thesocialCV.com/faq/RFJ993)      CHOL/HDLC RATIO 03/13/2025 1.9  <5.0 (calc) Final    NON HDL CHOLESTEROL 03/13/2025 80  <130 mg/dL (calc) Final    Comment: For patients with diabetes plus 1 major ASCVD risk   factor, treating to a non-HDL-C goal of <100 mg/dL   (LDL-C of <70 mg/dL) is considered a therapeutic   option.      DIRECT LDL 03/13/2025 68  <100 mg/dL Final    Comment:    Desirable range <100 mg/dL for primary prevention;    <70 mg/dL for patients with CHD or diabetic patients   with > or = 2 CHD risk factors.         GLUCOSE 03/13/2025 93  65 - 99 mg/dL Final    Comment:               Fasting reference interval         UREA NITROGEN (BUN) 03/13/2025 13  7 - 25 mg/dL Final    CREATININE 03/13/2025 0.62  0.60 - 0.95 mg/dL Final    EGFR 03/13/2025 87  > OR = 60 mL/min/1.73m2 Final    SODIUM 03/13/2025 144  135 - 146 mmol/L Final    POTASSIUM 03/13/2025 3.8  3.5 - 5.3 mmol/L Final    CHLORIDE 03/13/2025 104  98 - 110 mmol/L Final    CARBON DIOXIDE 03/13/2025 30  20 - 32 mmol/L Final    ELECTROLYTE BALANCE 03/13/2025 10  7 - 17 mmol/L (calc) Final    CALCIUM 03/13/2025 9.7  8.6 - 10.4 mg/dL Final    PROTEIN, TOTAL 03/13/2025 7.4  6.1 - 8.1 g/dL Final    ALBUMIN 03/13/2025 4.5  3.6 - 5.1 g/dL Final    BILIRUBIN, TOTAL 03/13/2025 0.8  0.2 - 1.2 mg/dL Final    ALKALINE PHOSPHATASE 03/13/2025 78  37 - 153 U/L  Final    AST 03/13/2025 29  10 - 35 U/L Final    ALT 03/13/2025 17  6 - 29 U/L Final    VITAMIN D,25-OH,TOTAL,IA 03/13/2025 39  30 - 100 ng/mL Final    Comment: Vitamin D Status         25-OH Vitamin D:     Deficiency:                    <20 ng/mL  Insufficiency:             20 - 29 ng/mL  Optimal:                 > or = 30 ng/mL     For 25-OH Vitamin D testing on patients on   D2-supplementation and patients for whom quantitation   of D2 and D3 fractions is required, the QuestAssureD(TM)  25-OH VIT D, (D2,D3), LC/MS/MS is recommended: order   code 95261 (patients >2yrs).     See Note 1     Note 1     For additional information, please refer to   http://education.Sonendo/faq/ANP954   (This link is being provided for informational/  educational purposes only.)           Review of Systems   A complete review of systems was performed and all systems were normal except what is noted in the HPI.      List of current healthcare providers:  Patient Care Team:  Kady Shane MD as PCP - General  Kady Shane MD as PCP - [employer] [facility] PCP        Over the past 2 weeks, how often have you been bothered by any of the following problems?  Little interest or pleasure in doing things: Not at all  Feeling down, depressed, or hopeless: Not at all  Patient Health Questionnaire-2 Score: 0             Advance Care Planning:    Living Will: Yes  POA: Yes    Objective :  /75   Pulse 74   Temp 35.8 °C (96.4 °F) (Temporal)   Wt 66.3 kg (146 lb 3.2 oz)   SpO2 97%   BMI 27.62 kg/m²    No results found.  Physical Exam  Constitutional:       General: She is not in acute distress.     Appearance: Normal appearance.   HENT:      Head: Normocephalic and atraumatic.      Right Ear: There is impacted cerumen.      Left Ear: There is impacted cerumen.   Cardiovascular:      Rate and Rhythm: Normal rate and regular rhythm.      Heart sounds: Normal heart sounds.   Pulmonary:      Effort: Pulmonary  effort is normal.      Breath sounds: Normal breath sounds.   Abdominal:      General: Abdomen is flat. Bowel sounds are normal.      Palpations: Abdomen is soft.      Tenderness: There is no abdominal tenderness.   Musculoskeletal:      Right lower leg: No edema.      Left lower leg: No edema.   Neurological:      Mental Status: She is alert.   Psychiatric:         Mood and Affect: Mood normal.         Behavior: Behavior normal.         Thought Content: Thought content normal.         Assessment/Plan :  Problem List Items Addressed This Visit       Essential hypertension     Well controlled. Continue current medicine and recheck in 6 months.           Relevant Medications    amLODIPine (Norvasc) 10 mg tablet    Other Relevant Orders    TSH with reflex to Free T4 if abnormal    CBC    Comprehensive Metabolic Panel    Hyperlipemia, mixed     Well controlled. Continue current medicine and recheck in 6 months.           Relevant Orders    Lipid Panel    Cholesterol, LDL Direct    Vitamin D insufficiency     Well controlled. Continue current medicine and recheck in 6 months.           Relevant Orders    Vitamin D 25-Hydroxy,Total (for eval of Vitamin D levels)    Medicare annual wellness visit, subsequent - Primary     Medical Wellness exam done.   Prevnar 13 11/16  Pneumovax 23 10/15  RSV 10/23  Shingrix series complete.   Nonsmoker  Not on opioids  Depression Screening  Depression screening completed using the PHQ-2 questions, with results documented in the chart/encounter (~5min).  (See Rooming Screening section for documentation, and/or progress note for additional information)           Other Visit Diagnoses       Annual physical exam        Yearly physical done               The following health maintenance schedule was reviewed with the patient and provided in printed form in the after visit summary:  Health Maintenance   Topic Date Due    DTaP/Tdap/Td Vaccines (1 - Tdap) Never done    Diabetes Screening   08/31/2024    Medicare Annual Wellness Visit (AWV)  03/29/2026    Bone Density Scan  04/19/2026    Lipid Panel  03/13/2030    RSV High Risk: (Elderly (60+) or Pregnant Population)  Completed    Influenza Vaccine  Completed    Pneumococcal Vaccine  Completed    Zoster Vaccines  Completed    COVID-19 Vaccine  Completed    HIB Vaccines  Aged Out    Hepatitis B Vaccines  Aged Out    IPV Vaccines  Aged Out    Hepatitis A Vaccines  Aged Out    Meningococcal Vaccine  Aged Out    Rotavirus Vaccines  Aged Out    HPV Vaccines  Aged Out    Welcome to Medicare Visit  Discontinued    Irritable Bowel Syndrome  Discontinued           Patient and family understand and agree with treatment plan.          Kady Shane MD

## 2025-03-28 ENCOUNTER — APPOINTMENT (OUTPATIENT)
Dept: PRIMARY CARE | Facility: CLINIC | Age: 85
End: 2025-03-28
Payer: MEDICARE

## 2025-03-28 VITALS
BODY MASS INDEX: 27.62 KG/M2 | SYSTOLIC BLOOD PRESSURE: 113 MMHG | WEIGHT: 146.2 LBS | TEMPERATURE: 96.4 F | OXYGEN SATURATION: 97 % | DIASTOLIC BLOOD PRESSURE: 75 MMHG | HEART RATE: 74 BPM

## 2025-03-28 DIAGNOSIS — E55.9 VITAMIN D INSUFFICIENCY: ICD-10-CM

## 2025-03-28 DIAGNOSIS — Z00.00 ANNUAL PHYSICAL EXAM: ICD-10-CM

## 2025-03-28 DIAGNOSIS — Z00.00 MEDICARE ANNUAL WELLNESS VISIT, SUBSEQUENT: Primary | ICD-10-CM

## 2025-03-28 DIAGNOSIS — I10 ESSENTIAL HYPERTENSION: ICD-10-CM

## 2025-03-28 DIAGNOSIS — E78.2 HYPERLIPEMIA, MIXED: ICD-10-CM

## 2025-03-28 DIAGNOSIS — H61.23 BILATERAL IMPACTED CERUMEN: ICD-10-CM

## 2025-03-28 RX ORDER — AMLODIPINE BESYLATE 10 MG/1
10 TABLET ORAL DAILY
Qty: 30 TABLET | Refills: 3 | Status: SHIPPED | OUTPATIENT
Start: 2025-03-28 | End: 2025-06-26

## 2025-03-28 ASSESSMENT — ACTIVITIES OF DAILY LIVING (ADL)
MANAGING_FINANCES: INDEPENDENT
DOING_HOUSEWORK: INDEPENDENT
TAKING_MEDICATION: INDEPENDENT
DRESSING: INDEPENDENT
GROCERY_SHOPPING: INDEPENDENT
BATHING: INDEPENDENT

## 2025-03-28 ASSESSMENT — PATIENT HEALTH QUESTIONNAIRE - PHQ9
SUM OF ALL RESPONSES TO PHQ9 QUESTIONS 1 AND 2: 0
1. LITTLE INTEREST OR PLEASURE IN DOING THINGS: NOT AT ALL
2. FEELING DOWN, DEPRESSED OR HOPELESS: NOT AT ALL

## 2025-03-28 ASSESSMENT — ENCOUNTER SYMPTOMS
DEPRESSION: 0
LOSS OF SENSATION IN FEET: 0
OCCASIONAL FEELINGS OF UNSTEADINESS: 1

## 2025-08-24 DIAGNOSIS — I10 ESSENTIAL HYPERTENSION: ICD-10-CM

## 2025-08-24 DIAGNOSIS — E55.9 VITAMIN D INSUFFICIENCY: ICD-10-CM

## 2025-08-24 DIAGNOSIS — E78.2 HYPERLIPEMIA, MIXED: ICD-10-CM

## 2025-08-29 ENCOUNTER — APPOINTMENT (OUTPATIENT)
Dept: UROLOGY | Facility: CLINIC | Age: 85
End: 2025-08-29
Payer: MEDICARE

## 2025-10-10 ENCOUNTER — APPOINTMENT (OUTPATIENT)
Dept: PRIMARY CARE | Facility: CLINIC | Age: 85
End: 2025-10-10
Payer: MEDICARE